# Patient Record
Sex: MALE | Race: BLACK OR AFRICAN AMERICAN | Employment: FULL TIME | URBAN - METROPOLITAN AREA
[De-identification: names, ages, dates, MRNs, and addresses within clinical notes are randomized per-mention and may not be internally consistent; named-entity substitution may affect disease eponyms.]

---

## 2018-01-15 ENCOUNTER — OFFICE VISIT (OUTPATIENT)
Dept: URGENT CARE | Age: 58
End: 2018-01-15

## 2018-01-15 VITALS
WEIGHT: 274 LBS | SYSTOLIC BLOOD PRESSURE: 132 MMHG | BODY MASS INDEX: 34.07 KG/M2 | DIASTOLIC BLOOD PRESSURE: 88 MMHG | HEART RATE: 88 BPM | RESPIRATION RATE: 20 BRPM | HEIGHT: 75 IN | TEMPERATURE: 98.3 F

## 2018-01-15 DIAGNOSIS — S69.92XA INJURY OF LEFT WRIST, INITIAL ENCOUNTER: ICD-10-CM

## 2018-01-15 DIAGNOSIS — S00.83XA CONTUSION OF FOREHEAD, INITIAL ENCOUNTER: ICD-10-CM

## 2018-01-15 DIAGNOSIS — S63.502A SPRAIN OF LEFT WRIST, INITIAL ENCOUNTER: Primary | ICD-10-CM

## 2018-01-15 PROCEDURE — 99203 OFFICE O/P NEW LOW 30 MIN: CPT | Performed by: EMERGENCY MEDICINE

## 2018-01-15 RX ORDER — NAPROXEN 500 MG/1
500 TABLET ORAL 2 TIMES DAILY PRN
Qty: 20 TABLET | Refills: 0 | Status: SHIPPED | OUTPATIENT
Start: 2018-01-15

## 2018-01-15 RX ORDER — AMITRIPTYLINE HYDROCHLORIDE 25 MG/1
25 TABLET, FILM COATED ORAL
COMMUNITY
Start: 2017-12-04

## 2018-01-15 ASSESSMENT — ENCOUNTER SYMPTOMS
COLOR CHANGE: 1
VOMITING: 0
ABDOMINAL PAIN: 0
NAUSEA: 0
SHORTNESS OF BREATH: 0
COUGH: 0
EYES NEGATIVE: 1

## 2018-01-15 NOTE — PROGRESS NOTES
Negative for abdominal pain, nausea and vomiting. Musculoskeletal: Positive for arthralgias and joint swelling. As in HPI   Skin: Positive for color change (redness of forehead). Negative for rash and wound. Neurological: Negative for dizziness, tingling, weakness, numbness and headaches. Physical Exam     Vitals:    01/15/18 1426   BP: 132/88   Pulse:    Resp:    Temp:        Physical Exam   Constitutional: He is oriented to person, place, and time. He appears well-developed and well-nourished. No distress. Neck: Normal range of motion and full passive range of motion without pain. No spinous process tenderness present. Cardiovascular: Normal rate, regular rhythm and normal heart sounds. Exam reveals no gallop and no friction rub. No murmur heard. Pulmonary/Chest: Effort normal and breath sounds normal. No accessory muscle usage. No respiratory distress. He has no decreased breath sounds. He has no wheezes. He has no rhonchi. He has no rales. Musculoskeletal:        Left elbow: He exhibits normal range of motion, no effusion and no deformity. Tenderness found. Lateral epicondyle (mild) tenderness noted. No radial head, no medial epicondyle and no olecranon process tenderness noted. Left wrist: He exhibits decreased range of motion, tenderness and swelling. He exhibits no crepitus and no deformity. Left knee: He exhibits normal range of motion, no effusion, no deformity, no LCL laxity, no bony tenderness and no MCL laxity. No medial joint line and no lateral joint line tenderness noted. Left hand: Normal. He exhibits normal range of motion, no tenderness, no deformity and no swelling. Normal sensation noted. Normal strength noted. No snuffbox tenderness   Neurological: He is alert and oriented to person, place, and time. He has normal strength. No sensory deficit. Gait normal.   Skin: Skin is warm and dry. There is erythema (slight erythema left forehead, no edema). Psychiatric: He has a normal mood and affect. His behavior is normal.       Procedure:     Wrist X-Ray    Interpreted by: me    Findings: Left Wrist: No fracture, Normal alignment, No foreign body      Left wrist splint applied      Assessment:    1. Sprain of left wrist, initial encounter    2. Contusion of forehead, initial encounter    3. Injury of left wrist, initial encounter        Plan:    Orders Placed This Encounter   Medications    naproxen (NAPROSYN) 500 MG tablet     Sig: Take 1 tablet by mouth 2 times daily as needed for Pain     Dispense:  20 tablet     Refill:  0         Patient Instructions     RX: NAPROSYN    Use splint for the next 5-7 days until pain improves    Follow-up with your primary care physician in 1 week if not improving. If you do not have a primary care physician, call [phone] for a referral or visit [email or web address]      Patient Education        Wrist Sprain: Care Instructions  Your Care Instructions    Your wrist hurts because you have stretched or torn ligaments, which connect the bones in your wrist.  Wrist sprains usually take from 2 to 10 weeks to heal, but some take longer. Usually, the more pain you have, the more severe your wrist sprain is and the longer it will take to heal. You can heal faster and regain strength in your wrist with good home treatment. Follow-up care is a key part of your treatment and safety. Be sure to make and go to all appointments, and call your doctor if you are having problems. It's also a good idea to know your test results and keep a list of the medicines you take. How can you care for yourself at home? · Prop up your arm on a pillow when you ice it or anytime you sit or lie down for the next 3 days. Try to keep your wrist above the level of your heart. This will help reduce swelling. · Put ice or cold packs on your wrist for 10 to 20 minutes at a time.  Try to do this every 1 to 2 hours for the next 3 days (when you are awake) or until the swelling goes down. Put a thin cloth between the ice pack and your skin. · After 2 or 3 days, if your swelling is gone, apply a heating pad set on low or a warm cloth to your wrist. This helps keep your wrist flexible. Some doctors suggest that you go back and forth between hot and cold. · If you have an elastic bandage, keep it on for the next 24 to 36 hours. The bandage should be snug but not so tight that it causes numbness or tingling. To rewrap the wrist, wrap the bandage around the hand a few times, beginning at the fingers. Then wrap it around the hand between the thumb and index finger, ending by circling the wrist several times. · If your doctor gave you a splint or brace, wear it as directed to protect your wrist until it has healed. · Take pain medicines exactly as directed. ¨ If the doctor gave you a prescription medicine for pain, take it as prescribed. ¨ If you are not taking a prescription pain medicine, ask your doctor if you can take an over-the-counter medicine. · Try not to use your injured wrist and hand. When should you call for help? Call your doctor now or seek immediate medical care if:  ? · Your hand or fingers are cool or pale or change color. ? Watch closely for changes in your health, and be sure to contact your doctor if:  ? · Your pain gets worse. ? · Your wrist has not improved after 1 week. Where can you learn more? Go to [email or web address]. org and sign in to your [employer] account. Enter M073 in the [facility] box to learn more about \"Wrist Sprain: Care Instructions. \"     If you do not have an account, please click on the \"Sign Up Now\" link. Current as of: March 21, 2017  Content Version: 11.5  © 5603-3011 Healthwise, [employer]. Care instructions adapted under license by [facility] ([facility]).  If you have questions about a medical condition or this instruction, always ask your healthcare professional. Elvia Alvarado

## 2018-01-15 NOTE — PATIENT INSTRUCTIONS
ending by circling the wrist several times. · If your doctor gave you a splint or brace, wear it as directed to protect your wrist until it has healed. · Take pain medicines exactly as directed. ¨ If the doctor gave you a prescription medicine for pain, take it as prescribed. ¨ If you are not taking a prescription pain medicine, ask your doctor if you can take an over-the-counter medicine. · Try not to use your injured wrist and hand. When should you call for help? Call your doctor now or seek immediate medical care if:  ? · Your hand or fingers are cool or pale or change color. ? Watch closely for changes in your health, and be sure to contact your doctor if:  ? · Your pain gets worse. ? · Your wrist has not improved after 1 week. Where can you learn more? Go to [email or web address] org and sign in to your [employer] account. Enter R150 in the [employer] box to learn more about \"Wrist Sprain: Care Instructions. \"     If you do not have an account, please click on the \"Sign Up Now\" link. Current as of: March 21, 2017  Content Version: 11.5  © 6965-8450 Healthwise, Incorporated. Care instructions adapted under license by [facility] ([facility]). If you have questions about a medical condition or this instruction, always ask your healthcare professional. [address] any warranty or liability for your use of this information.

## 2019-05-20 ENCOUNTER — OFFICE VISIT (OUTPATIENT)
Dept: ORTHOPEDIC SURGERY | Age: 59
End: 2019-05-20
Payer: COMMERCIAL

## 2019-05-20 VITALS — BODY MASS INDEX: 34.01 KG/M2 | WEIGHT: 265 LBS | HEIGHT: 74 IN

## 2019-05-20 DIAGNOSIS — M25.561 RIGHT KNEE PAIN, UNSPECIFIED CHRONICITY: Primary | ICD-10-CM

## 2019-05-20 DIAGNOSIS — M25.561 ACUTE BILATERAL KNEE PAIN: ICD-10-CM

## 2019-05-20 DIAGNOSIS — M25.562 ACUTE BILATERAL KNEE PAIN: ICD-10-CM

## 2019-05-20 DIAGNOSIS — M17.0 PRIMARY OSTEOARTHRITIS OF BOTH KNEES: ICD-10-CM

## 2019-05-20 PROCEDURE — 99204 OFFICE O/P NEW MOD 45 MIN: CPT | Performed by: ORTHOPAEDIC SURGERY

## 2019-05-20 PROCEDURE — L1812 KO ELASTIC W/JOINTS PRE OTS: HCPCS | Performed by: ORTHOPAEDIC SURGERY

## 2019-05-20 NOTE — PROGRESS NOTES
MD Jv Al PA-C         Orthopaedic Surgery and Sports Medicine        Chief Complaint:  Knee Pain (RIGHT )      History of Present Illness:  Zuleyka Johnson is a 63 y.o. male here regarding bilateral knee pain. Miriam Ma His right knee is more symptomatic than his left. Miriam Ma He was seen for his right knee in 2015. The pain is located global.   Patient feels this discomfort may be related to a known injury No    The patient describes the symptoms as aching and sharp. Symptoms improve with rest.     The symptoms are worse with activity, stair climbing, kneeling, deep knee bending, getting up from a chair, weight bearing. Discomfort has been progressive over time. Sleeping habits related to chief complaint:good    He currently is working for [employer]. He is a . He's been there greater than 20 years. He stands on concrete all day every day. Outside reports reviewed: none. Medical History:  Patient's medications, allergies, past medical, surgical, social and family histories were reviewed and updated as appropriate.     Past Medical History:   Diagnosis Date    Acid reflux     Hyperlipidemia     Hypertension        Past Surgical History:   Procedure Laterality Date    COLONOSCOPY  10/6/15    divert    HERNIA REPAIR      PRE-MALIGNANT / BENIGN SKIN LESION EXCISION      Lump removed from under right arm    WISDOM TOOTH EXTRACTION      2 extracted and still has 2       Family History   Problem Relation Age of Onset    Cancer Sister     Diabetes Sister     Diabetes Mother        Social History     Socioeconomic History    Marital status:      Spouse name: None    Number of children: None    Years of education: None    Highest education level: None   Occupational History    None   Social Needs    Financial resource strain: None    Food insecurity:     Worry: None Inability: None    Transportation needs:     Medical: None     Non-medical: None   Tobacco Use    Smoking status: Former Smoker     Years: 15.00     Types: Cigars    Smokeless tobacco: Never Used   Substance and Sexual Activity    Alcohol use: Yes     Alcohol/week: 14.4 oz     Types: 12 Cans of beer, 12 Shots of liquor per week    Drug use: No    Sexual activity: None     Comment: , monogamous   Lifestyle    Physical activity:     Days per week: None     Minutes per session: None    Stress: None   Relationships    Social connections:     Talks on phone: None     Gets together: None     Attends [employer] service: None     Active member of club or organization: None     Attends meetings of clubs or organizations: None     Relationship status: None    Intimate partner violence:     Fear of current or ex partner: None     Emotionally abused: None     Physically abused: None     Forced sexual activity: None   Other Topics Concern    None   Social History Narrative    None       Current Outpatient Medications   Medication Sig Dispense Refill    amitriptyline (ELAVIL) 25 MG tablet Take 25 mg by mouth      naproxen (NAPROSYN) 500 MG tablet Take 1 tablet by mouth 2 times daily as needed for Pain 20 tablet 0    Olmesartan Medoxomil-HCTZ (BENICAR HCT PO) Take  by mouth.  pravastatin (PRAVACHOL) 40 MG tablet Take 40 mg by mouth daily.  aspirin 81 MG EC tablet Take 81 mg by mouth daily. No current facility-administered medications for this visit. Allergies   Allergen Reactions    Bactrim [Sulfamethoxazole-Trimethoprim] Diarrhea     Leads to dehydration and hypotension. Review of Systems:   Pertinent items are noted in HPI  Review of systems reviewed from Patient History Form dated on 5/20/2019 and available in the patient's chart under the Media tab. Vital Signs: There were no vitals filed for this visit.     Pain Assessment:  Pain Assessment  Location of Pain: Knee  Location Modifiers: Right  Severity of Pain: 8  Quality of Pain: Dull, Aching, Cracking, Popping, Grinding  Frequency of Pain: Intermittent  Aggravating Factors: Walking, Stairs, Standing, Squatting, Kneeling  Limiting Behavior: Some  Relieving Factors: Rest  Result of Injury: No  Work-Related Injury: No  Are there other pain locations you wish to document?: No         General Exam:   Constitutional: Patient is adequately groomed with no evidence of malnutrition  Mental Status: The patient is oriented to time, place and person. The patient's mood and affect are appropriate. Lymphatic: The lymphatic examination bilaterally reveals all areas to be without enlargement or induration. Vascular: Examination reveals no swelling or calf tenderness. Peripheral pulses are palpable and 2+. Neurological: The patient has good coordination. There is no weakness or sensory deficit. Bilateral Knee Examination, . Both knees were examined today. Both knees are very similar. On exam.    Inspection:   Knee alignment: mild varus  mild swelling noted. No erythema or ecchymosis. Skin is intact with no cellulitis, rashes, ulcerations, lymphedema or cutaneous lesions noted. Gait: normal and antalgic. The patient can bear weight on the extremity. Palpation: mild tenderness to palpation on the medial joint line. a small effusion noted. Range of Motion:  normal, pain with terminal flexion and pain with terminal extension    Special Tests: Zandra's test: negative       Varus laxity at 30 degrees: negative       Valgus laxity at 30 degrees: negative    Strength: no gross motor weakness noted. Motor exam of the lower extremities show quadriceps, hamstrings, foot dorsiflexion and plantarflexion grossly intact. Neurologic & vascular: Sensation to both feet is grossly intact to light touch. The bilateral lower extremities are warm and well-perfused with brisk capillary refill.     Additional Examinations:  Contralateral Exam: these were examined today. Both are very similar. On examination. ..  He also had bilateral hip exams today primarily for range of motion of both essentially normal range of motion without pain. DIAGNOSTICS  Xrays obtained in office today: Yes  Xrays reviewed today: Yes  4 views of the bilateral knee show   Fracture:no   Dislocation: no  Patellofemoral arthritis: moderate  Medial compartment: moderate  Lateral compartment: mild  Varus deformity: Yes  Valgus deformity: No      ASSESSMENT (Medical Decision Making)    Mabel Bettencourt is a 63 y.o. male with the following diagnosis:  bilateral moderate knee arthritis . Both knees with some medial compartment narrowing and osteophyte formation. ICD-10-CM    1. Right knee pain, unspecified chronicity M25.561 XR KNEE RIGHT (MIN 4 VIEWS)     Economy Hinged Knee Wrap Around   2. Primary osteoarthritis of both knees M17.0 Economy Hinged Knee Wrap Around   3. Acute bilateral knee pain M25.561     M25.562        His overall course is worsening despite conservative treatment      PLAN (Medical Decision Making)  Office Procedures:  Orders Placed This Encounter   Procedures    XR KNEE RIGHT (MIN 4 VIEWS)     Standing Status:   Future     Number of Occurrences:   1     Standing Expiration Date:   5/16/2020    Economy Hinged Knee Wrap Around     Patient was prescribed a Breg Economy Hinged Wrap Around Knee Brace. The bilateral knee will require stabilization / immobilization from this semi-rigid / rigid orthosis to improve their function. The orthosis will assist in protecting the affected area, provide functional support and facilitate healing. The patient was educated and fit by a healthcare professional with expert knowledge and specialization in brace application while under the direct supervision of the treating physician. Verbal and written instructions for the use of and application of this item were provided.    They

## 2019-05-20 NOTE — PROGRESS NOTES
SITE:RIGHT KNEE    MARCAINE  NDC# 5505-4594-12  LOT NUMBER#  [id number]    DEPO 40MG  NDC# 8928-3458-09  LOT NUMBER#  [id number]    LIDOCAINE    NDC# 9855-1505-36  LOT NUMBER#  [id number]

## 2019-05-22 ENCOUNTER — TELEPHONE (OUTPATIENT)
Dept: ORTHOPEDIC SURGERY | Age: 59
End: 2019-05-22

## 2019-05-22 NOTE — TELEPHONE ENCOUNTER
05/22/2019  [address] [facility]  (SERIES OF 3) BILATERAL KNEES. APPROVED #  [id number]. DATES: 05/21/2019 - 07/11/2019. PER FAX FROM [facility].   CHEYANNE

## 2019-05-30 ENCOUNTER — OFFICE VISIT (OUTPATIENT)
Dept: ORTHOPEDIC SURGERY | Age: 59
End: 2019-05-30
Payer: COMMERCIAL

## 2019-05-30 VITALS — BODY MASS INDEX: 34.01 KG/M2 | HEIGHT: 74 IN | WEIGHT: 265 LBS

## 2019-05-30 DIAGNOSIS — M17.11 PRIMARY OSTEOARTHRITIS OF RIGHT KNEE: Primary | ICD-10-CM

## 2019-05-30 DIAGNOSIS — M17.12 PRIMARY OSTEOARTHRITIS OF LEFT KNEE: ICD-10-CM

## 2019-05-30 PROCEDURE — 20610 DRAIN/INJ JOINT/BURSA W/O US: CPT | Performed by: PHYSICIAN ASSISTANT

## 2019-05-30 NOTE — PROGRESS NOTES
5/30/19 2:11 PM     Site & comments:   RIGHT KNEE    NDC: [address]-53    Lot number: [id number]    Product:  [address]

## 2019-05-30 NOTE — PROGRESS NOTES
Subjective    Patient ID: Krzysztof Montiel is a 63 y.o. .  male. Chief Complaint   Patient presents with    Knee Pain     LEFT   # 1 [address]       Patient presents today for the 1st injection of Orthovisc  . Pt has been previously diagnosed with osteoarthritis of the knee as documented in the prior progress notes. This injection is for the patients Left knee. Patient denies and fevers, chills, recent infections, or new injuries to the knee. Pain Assessment:       Patient's medications, allergies, past medical, surgical, social and family histories were reviewed and updated as appropriate. Physical Exam:    The patient does have  pain on motion, there is an effusion, there is tenderness over the  medial, lateral region. No erythema noted. Sensation intact to touch. Pulses present distally. Procedure Note:    The patients Left knee was initially prepped using Betadine swab. The superior lateral aspect of the knee was prepped and used for this injection. Under aseptic technique the soft tissues were anesthetized using 1% Lidocaine without epinephrine. A total of 2ml of Lidocaine was injected into the soft tissues leading up to the joint capsule. Following adequate anesthesia, approx 5ml of clear yellow fluid was aspirated and then the 2ml of Orthovisc  injection was performed. This was injected directly into the joint capsule of the knee. No complications were encountered and the patient tolerated the procedure well. A band aid was placed over the injection site following the procedure.       ORTHOVISC INJ PER DOSE  Assessment:  1) Orthovisc injection of the Left knee  2) Osteoarthritis    Plan:  1) ice, elevation, gentle range of motion as needed for swelling or stiffness of the knee  2) NSAIDs for any pain after injection   3) F/U 1wk for 2nd injection in the left knee and 1st injection in the right knee

## 2019-06-06 ENCOUNTER — NURSE ONLY (OUTPATIENT)
Dept: ORTHOPEDIC SURGERY | Age: 59
End: 2019-06-06
Payer: COMMERCIAL

## 2019-06-06 VITALS — HEIGHT: 74 IN | BODY MASS INDEX: 34.01 KG/M2 | WEIGHT: 265 LBS

## 2019-06-06 DIAGNOSIS — M17.0 PRIMARY OSTEOARTHRITIS OF BOTH KNEES: Primary | ICD-10-CM

## 2019-06-06 PROCEDURE — 20611 DRAIN/INJ JOINT/BURSA W/US: CPT | Performed by: PHYSICIAN ASSISTANT

## 2019-06-06 NOTE — PROGRESS NOTES
6/6/19 1:31 PM     Site & comments:   EFREM ZARAGZOA    NDC: [address]-07    Lot number: [id number]    Product:  [address]

## 2019-06-06 NOTE — PROGRESS NOTES
Subjective    Patient ID: Romeo Herrera is a 63 y.o. .  male. Chief Complaint   Patient presents with    Knee Pain     B/L   # 2 LEFT # 1 RIGHT       Patient presents today for the 1st injection of Orthovisc in the right knee and 2nd injection in left knee . Pt has been previously diagnosed with osteoarthritis of the knee as documented in the prior progress notes. This injection is for the patients bilateral knees. Patient denies and fevers, chills, recent infections, or new injuries to the knee. Pain Assessment:       Patient's medications, allergies, past medical, surgical, social and family histories were reviewed and updated as appropriate. Physical Exam:    The patient does have  pain on motion, there is an effusion, there is tenderness over the  medial, lateral region. No erythema noted. Sensation intact to touch. Pulses present distally. Procedure Note:  The patient was given the option of performing today's injection using ultrasound guidance. We discussed the pros and cons of using the ultrasound for guidance. The patient chose to proceed, and today's injection was performed under sterile conditions using and [employer] ultrasound unit with a variable frequency (6.0-15.0  Mhz) linear transducer for localization and needle placement. The image was saved for the medical record. The patients bilateral knee was initially prepped using Betadine swab. The superior lateral aspect of the knee was prepped and used for this injection. Under aseptic technique the soft tissues were anesthetized using 1% Lidocaine without epinephrine. A total of 2ml of Lidocaine was injected into the soft tissues leading up to the joint capsule. Following adequate anesthesia, the 2ml of Orthovisc  injection was performed. This was injected directly into the joint capsule of the knee. No complications were encountered and the patient tolerated the procedure well.   A band aid was placed over the injection site following the procedure.       FL ARTHROCENTESIS ASPIR&/INJ MAJOR JT/BURSA W/O US  ORTHOVISC INJ PER DOSE  Assessment:  1) Orthovisc injection of the bilateral knee  2) Osteoarthritis    Plan:  1) ice, elevation, gentle range of motion as needed for swelling or stiffness of the knee  2) NSAIDs for any pain after injection   3) F/U 1wk for 3rd injection in the left knee and 2nd injection in the right knee

## 2019-06-13 ENCOUNTER — NURSE ONLY (OUTPATIENT)
Dept: ORTHOPEDIC SURGERY | Age: 59
End: 2019-06-13
Payer: COMMERCIAL

## 2019-06-13 VITALS — HEIGHT: 74 IN | WEIGHT: 265 LBS | BODY MASS INDEX: 34.01 KG/M2

## 2019-06-13 DIAGNOSIS — M17.0 PRIMARY OSTEOARTHRITIS OF BOTH KNEES: Primary | ICD-10-CM

## 2019-06-13 PROCEDURE — 20611 DRAIN/INJ JOINT/BURSA W/US: CPT | Performed by: PHYSICIAN ASSISTANT

## 2019-06-13 NOTE — PROGRESS NOTES
Subjective    Patient ID: Greta Mendez is a 63 y.o. .  male. Chief Complaint   Patient presents with    Knee Pain     B/L ORTHOVISC       Patient presents today for the 2nd injection of Orthovisc in the right knee and 3rd injection in left knee . Pt has been previously diagnosed with osteoarthritis of the knee as documented in the prior progress notes. This injection is for the patients bilateral knees. Patient denies and fevers, chills, recent infections, or new injuries to the knee. Pain Assessment:       Patient's medications, allergies, past medical, surgical, social and family histories were reviewed and updated as appropriate. Physical Exam:    The patient does have  pain on motion, there is an effusion on the right knee, there is tenderness over the  medial, lateral region. No erythema noted. Sensation intact to touch. Pulses present distally. Procedure Note:  The patient was given the option of performing today's injection using ultrasound guidance. We discussed the pros and cons of using the ultrasound for guidance. The patient chose to proceed, and today's injection was performed under sterile conditions using and [employer] ultrasound unit with a variable frequency (6.0-15.0  Mhz) linear transducer for localization and needle placement. The image was saved for the medical record. The patients bilateral knee was initially prepped using Betadine swab. The superior lateral aspect of the knee was prepped and used for this injection. Under aseptic technique the soft tissues were anesthetized using 1% Lidocaine without epinephrine. A total of 2ml of Lidocaine was injected into the soft tissues leading up to the joint capsule. Following adequate anesthesia, approx 15ml of clear yellow fluid was aspirated from right knee and then the 2ml of Orthovisc  injections were performed. This was injected directly into the joint capsule of the knee.   No complications were encountered and the patient tolerated the procedure well. A band aid was placed over the injection site following the procedure.       LA ARTHROCENTESIS ASPIR&/INJ MAJOR JT/BURSA W/O US  ORTHOVISC INJ PER DOSE  Assessment:  1) Orthovisc injection of the bilateral knees  2) Osteoarthritis    Plan:  1) ice, elevation, gentle range of motion as needed for swelling or stiffness of the knee  2) NSAIDs for any pain after injection   3) F/U 2wks for 3rd injection in the right knee

## 2019-06-13 NOTE — PROGRESS NOTES
6/13/19 1:23 PM     Site & comments:   EFREM ZARAGOZA ND: [id number]    Lot number: [id number]    Product:  Dariusz Rosario

## 2019-06-27 ENCOUNTER — NURSE ONLY (OUTPATIENT)
Dept: ORTHOPEDIC SURGERY | Age: 59
End: 2019-06-27
Payer: COMMERCIAL

## 2019-06-27 VITALS — WEIGHT: 265 LBS | BODY MASS INDEX: 34.01 KG/M2 | HEIGHT: 74 IN

## 2019-06-27 DIAGNOSIS — M17.11 PRIMARY OSTEOARTHRITIS OF RIGHT KNEE: Primary | ICD-10-CM

## 2019-06-27 NOTE — PROGRESS NOTES
Subjective    Patient ID: Rita Hsu is a 63 y.o. .  male. Chief Complaint   Patient presents with    Knee Pain     RIGHT       Patient presents today for the 3rd injection of Orthovisc in the right knee. Pt has been previously diagnosed with osteoarthritis of the knee as documented in the prior progress notes. This injection is for the patients right knees. Patient denies and fevers, chills, recent infections, or new injuries to the knee. Pain Assessment:       Patient's medications, allergies, past medical, surgical, social and family histories were reviewed and updated as appropriate. Physical Exam:    The patient does have  pain on motion, there is not an effusion on the right knee, there is tenderness over the  medial, lateral region. No erythema noted. Sensation intact to touch. Pulses present distally. Procedure Note:  The patient was given the option of performing today's injection using ultrasound guidance. We discussed the pros and cons of using the ultrasound for guidance. The patient chose to proceed, and today's injection was performed under sterile conditions using and Codefied ultrasound unit with a variable frequency (6.0-15.0  Mhz) linear transducer for localization and needle placement. The image was saved for the medical record. The patients right knee was initially prepped using Betadine swab. The superior lateral aspect of the knee was prepped and used for this injection. Under aseptic technique the soft tissues were anesthetized using 1% Lidocaine without epinephrine. A total of 2ml of Lidocaine was injected into the soft tissues leading up to the joint capsule. Following adequate anesthesia, the 2ml of Orthovisc  injections was performed. This was injected directly into the joint capsule of the knee. No complications were encountered and the patient tolerated the procedure well.   A band aid was placed over the injection site following the

## 2019-06-27 NOTE — PROGRESS NOTES
6/27/19 1:02 PM     Site & comments:   RIGHT KNEE    DGT:[id number]    Lot number: [id number]    Product:  Gopi Mancini

## 2020-02-20 ENCOUNTER — OFFICE VISIT (OUTPATIENT)
Dept: ORTHOPEDIC SURGERY | Age: 60
End: 2020-02-20
Payer: COMMERCIAL

## 2020-02-20 VITALS — HEIGHT: 74 IN | WEIGHT: 265 LBS | BODY MASS INDEX: 34.01 KG/M2

## 2020-02-20 PROCEDURE — 99213 OFFICE O/P EST LOW 20 MIN: CPT | Performed by: PHYSICIAN ASSISTANT

## 2020-02-20 RX ORDER — METHYLPREDNISOLONE ACETATE 40 MG/ML
80 INJECTION, SUSPENSION INTRA-ARTICULAR; INTRALESIONAL; INTRAMUSCULAR; SOFT TISSUE ONCE
Status: COMPLETED | OUTPATIENT
Start: 2020-02-20 | End: 2020-02-20

## 2020-02-20 RX ADMIN — METHYLPREDNISOLONE ACETATE 80 MG: 40 INJECTION, SUSPENSION INTRA-ARTICULAR; INTRALESIONAL; INTRAMUSCULAR; SOFT TISSUE at 16:26

## 2020-02-20 NOTE — PROGRESS NOTES
Suellen Habermann, MD Jolly [place], [place]         Orthopaedic Surgery and Sports Medicine      Patient Name: Dina Lugo  YOB: 1960  Patient's PCP is Jc Menezes MD    SUBJECTIVE  Chief Complaint:  Knee Pain (RIGHT   )      History of Present Illness:  Dina Lugo is a 63 y.o. male here regarding right knee pain. The pain began 02/14/2020. There was a history of injury, when he tripped over a curb and landed directly on his knee. The patient is currently ambulating independently. History of swelling: Yes  History of \"giving way\": No  History of instability: No  History of popping and/or catching: No    The pain is located medial, lateral.   The patient describes the symptoms as aching, sharp and tight. He rates pain at 7/10. Symptoms improve with rest, avoiding painful activities. The symptoms are made worse with weight bearing. Sleep pattern is affected by the chief complaint: No    The patient has not had PT. The patient has had an injection, series of 3 Orthovisc injections in June 2019. The patient has taken NSAIDs, uses Voltaren gel as well as taking Tylenol. The patient is working. Patient has a known history of bilateral osteoarthritis. He had good success with series of 3 Orthovisc injections this summer and would like to proceed with those injections again. Pain Assessment:       Past Medical History:  Past Medical History:   Diagnosis Date    Acid reflux     Hyperlipidemia     Hypertension        Past Surgical History:  Past Surgical History:   Procedure Laterality Date    COLONOSCOPY  10/6/15    divert    HERNIA REPAIR      PRE-MALIGNANT / BENIGN SKIN LESION EXCISION      Lump removed from under right arm    WISDOM TOOTH EXTRACTION      2 extracted and still has 2       Allergies:   Allergies   Allergen Reactions    Bactrim [Sulfamethoxazole-Trimethoprim] Diarrhea     Leads to dehydration and hypotension. Medications:  Current Outpatient Medications   Medication Sig Dispense Refill    amitriptyline (ELAVIL) 25 MG tablet Take 25 mg by mouth      naproxen (NAPROSYN) 500 MG tablet Take 1 tablet by mouth 2 times daily as needed for Pain 20 tablet 0    Olmesartan Medoxomil-HCTZ (BENICAR HCT PO) Take  by mouth.  pravastatin (PRAVACHOL) 40 MG tablet Take 40 mg by mouth daily.  aspirin 81 MG EC tablet Take 81 mg by mouth daily. No current facility-administered medications for this visit. Review of Systems:  Hoda Mahmood's review of systems has been performed by intake and observation. All past and current ROS forms have been scanned into the medical record. She has been instructed to contact her primary care provider regarding ROS issues if not already being addressed at this time. There are no recent changes. The most recent ROS was scanned into media on 02/20/2020       OBJECTIVE  PHYSICAL EXAM  Vital Signs: There were no vitals filed for this visit. Body mass index is 34.02 kg/m². General Exam:   Constitutional: Patient is adequately groomed with no evidence of malnutrition  Mental Status: The patient is oriented to time, place and person. The patient's mood and affect are appropriate. Vascular: Examination reveals no swelling or calf tenderness. Peripheral pulses are palpable and 2+. Neurological: The patient has good coordination. There is no weakness or sensory deficit. Right Knee Examination  Inspection:   Knee alignment: neutral  moderate swelling noted. No erythema or ecchymosis. Skin is intact with no cellulitis, rashes, ulcerations, lymphedema or cutaneous lesions noted. Gait: Slight antalgic. The patient can bear weight on the extremity. Palpation: mild tenderness to palpation on the lateral joint line. a moderate effusion noted.     Range of Motion:  Full, with errors. It is possible that there are still dictated errors within this office note. If so, please bring any errors to my attention for an addendum. All efforts were made to ensure that this office note is accurate.

## 2020-02-24 ENCOUNTER — TELEPHONE (OUTPATIENT)
Dept: ORTHOPEDIC SURGERY | Age: 60
End: 2020-02-24

## 2020-03-05 ENCOUNTER — OFFICE VISIT (OUTPATIENT)
Dept: ORTHOPEDIC SURGERY | Age: 60
End: 2020-03-05
Payer: COMMERCIAL

## 2020-03-05 VITALS — HEIGHT: 74 IN | WEIGHT: 265 LBS | BODY MASS INDEX: 34.01 KG/M2

## 2020-03-05 PROCEDURE — 99213 OFFICE O/P EST LOW 20 MIN: CPT | Performed by: PHYSICIAN ASSISTANT

## 2020-03-05 PROCEDURE — 20610 DRAIN/INJ JOINT/BURSA W/O US: CPT | Performed by: PHYSICIAN ASSISTANT

## 2020-03-05 RX ORDER — LIDOCAINE HYDROCHLORIDE 10 MG/ML
20 INJECTION, SOLUTION INFILTRATION; PERINEURAL ONCE
Status: COMPLETED | OUTPATIENT
Start: 2020-03-05 | End: 2020-03-05

## 2020-03-05 RX ADMIN — LIDOCAINE HYDROCHLORIDE 40 ML: 10 INJECTION, SOLUTION INFILTRATION; PERINEURAL at 15:50

## 2020-03-05 NOTE — PROGRESS NOTES
Subjective    Patient ID: Josef Ramos is a 63 y.o. .  male. Chief Complaint   Patient presents with    Knee Pain     B/L  KNEE       Patient presents today for the 1st injection of Orthovisc  . Pt has been previously diagnosed with osteoarthritis of the knee as documented in the prior progress notes. He has tried cortisone injections in the past with minimal relief. He has had good success with Orthovisc in the past and would like to proceed with that again. This injection is for the patients Bilateral knees. Patient denies and fevers, chills, recent infections, or new injuries to the knee. Pain Assessment:       Patient's medications, allergies, past medical, surgical, social and family histories were reviewed and updated as appropriate. Physical Exam:  Body mass index is 34.02 kg/m². Patient is alert and oriented to person place and time. Patient's mood and affect are within normal limits  The patient does have  pain on motion, has essentially full range of motion bilaterally, there is an effusion on the left knee, there is tenderness over the  medial, lateral regions. No erythema noted. Sensation intact to touch. Pulses present distally. Procedure Note:    The patients Bilateral knees were initially prepped using Betadine swab. The superior lateral aspect of the knee was prepped and used for this injection. Under aseptic technique the soft tissues were anesthetized using 1% Lidocaine without epinephrine. A total of 2ml of Lidocaine was injected into the soft tissues leading up to the joint capsule. Following adequate anesthesia, approximately 10 mL of clear yellow fluid was aspirated from the left knee and then the 2ml of Orthovisc  injection was performed. This was injected directly into the joint capsule of the knee. No complications were encountered and the patient tolerated the procedure well.   A band aid was placed over the injection site following the procedure.       [place] ARTHROCENTESIS ASPIR&/INJ MAJOR JT/BURSA W/O US  Assessment:  1) Today we will plan on beginning the series of 3 Orthovisc injections of the Bilateral knees  2) Osteoarthritis    Plan:  1) ice, elevation, gentle range of motion as needed for swelling or stiffness of the knee  2) NSAIDs for any pain after injection   3) F/U 1wk for 2nd injections

## 2020-03-12 ENCOUNTER — NURSE ONLY (OUTPATIENT)
Dept: ORTHOPEDIC SURGERY | Age: 60
End: 2020-03-12
Payer: COMMERCIAL

## 2020-03-12 VITALS — HEIGHT: 74 IN | BODY MASS INDEX: 34.01 KG/M2 | WEIGHT: 264.99 LBS

## 2020-03-12 PROCEDURE — 20610 DRAIN/INJ JOINT/BURSA W/O US: CPT | Performed by: PHYSICIAN ASSISTANT

## 2020-03-12 NOTE — PROGRESS NOTES
US  Assessment:  1) Today we will plan on beginning the series of 3 Orthovisc injections of the Bilateral knees  2) Osteoarthritis    Plan:  1) ice, elevation, gentle range of motion as needed for swelling or stiffness of the knee  2) NSAIDs for any pain after injection   3) F/U 2wks for 3rd injection

## 2020-03-25 PROBLEM — N17.9 ACUTE RENAL FAILURE (HCC): Status: RESOLVED | Noted: 2020-03-25 | Resolved: 2020-03-24

## 2020-03-26 ENCOUNTER — OFFICE VISIT (OUTPATIENT)
Dept: ORTHOPEDIC SURGERY | Age: 60
End: 2020-03-26
Payer: COMMERCIAL

## 2020-03-26 VITALS — WEIGHT: 264 LBS | BODY MASS INDEX: 33.88 KG/M2 | HEIGHT: 74 IN

## 2020-03-26 PROCEDURE — 99213 OFFICE O/P EST LOW 20 MIN: CPT | Performed by: ORTHOPAEDIC SURGERY

## 2020-03-26 RX ORDER — LIDOCAINE HYDROCHLORIDE 10 MG/ML
20 INJECTION, SOLUTION INFILTRATION; PERINEURAL ONCE
Status: COMPLETED | OUTPATIENT
Start: 2020-03-26 | End: 2020-03-26

## 2020-03-26 RX ADMIN — LIDOCAINE HYDROCHLORIDE 40 ML: 10 INJECTION, SOLUTION INFILTRATION; PERINEURAL at 16:16

## 2020-08-24 ENCOUNTER — OFFICE VISIT (OUTPATIENT)
Dept: ORTHOPEDIC SURGERY | Age: 60
End: 2020-08-24
Payer: COMMERCIAL

## 2020-08-24 VITALS — HEIGHT: 74 IN | BODY MASS INDEX: 33.88 KG/M2 | WEIGHT: 264 LBS

## 2020-08-24 PROCEDURE — 99213 OFFICE O/P EST LOW 20 MIN: CPT | Performed by: ORTHOPAEDIC SURGERY

## 2020-08-24 PROCEDURE — 20610 DRAIN/INJ JOINT/BURSA W/O US: CPT | Performed by: ORTHOPAEDIC SURGERY

## 2020-08-24 RX ORDER — METHYLPREDNISOLONE ACETATE 40 MG/ML
40 INJECTION, SUSPENSION INTRA-ARTICULAR; INTRALESIONAL; INTRAMUSCULAR; SOFT TISSUE ONCE
Status: COMPLETED | OUTPATIENT
Start: 2020-08-24 | End: 2020-08-24

## 2020-08-24 RX ORDER — BUPIVACAINE HYDROCHLORIDE 2.5 MG/ML
40 INJECTION, SOLUTION INFILTRATION; PERINEURAL ONCE
Status: COMPLETED | OUTPATIENT
Start: 2020-08-24 | End: 2020-08-24

## 2020-08-24 RX ORDER — LIDOCAINE HYDROCHLORIDE 10 MG/ML
20 INJECTION, SOLUTION INFILTRATION; PERINEURAL ONCE
Status: COMPLETED | OUTPATIENT
Start: 2020-08-24 | End: 2020-08-24

## 2020-08-24 RX ADMIN — METHYLPREDNISOLONE ACETATE 40 MG: 40 INJECTION, SUSPENSION INTRA-ARTICULAR; INTRALESIONAL; INTRAMUSCULAR; SOFT TISSUE at 17:41

## 2020-08-24 RX ADMIN — LIDOCAINE HYDROCHLORIDE 20 ML: 10 INJECTION, SOLUTION INFILTRATION; PERINEURAL at 17:42

## 2020-08-24 RX ADMIN — BUPIVACAINE HYDROCHLORIDE 100 MG: 2.5 INJECTION, SOLUTION INFILTRATION; PERINEURAL at 17:43

## 2020-08-24 NOTE — PROGRESS NOTES
regarding ROS issues if not already being addressed at this time. There are no recent changes. The most recent ROS was scanned into media on 5/20/2019    Past Medical History:  (see most recent intake form scanned into media on above date)  Past Medical History:   Diagnosis Date    Acid reflux     Hyperlipidemia     Hypertension        Past Surgical History:  (see most recent intake form scanned into media on above date)  Past Surgical History:   Procedure Laterality Date    COLONOSCOPY  10/6/15    divert    HERNIA REPAIR      PRE-MALIGNANT / BENIGN SKIN LESION EXCISION      Lump removed from under right arm    WISDOM TOOTH EXTRACTION      2 extracted and still has 2       Allergies:  (see most recent intake form scanned into media on above date)  Allergies   Allergen Reactions    Bactrim [Sulfamethoxazole-Trimethoprim] Diarrhea     Leads to dehydration and hypotension. Medications:  (see most recent intake form scanned into media on above date)  Current Outpatient Medications   Medication Sig Dispense Refill    amitriptyline (ELAVIL) 25 MG tablet Take 25 mg by mouth      naproxen (NAPROSYN) 500 MG tablet Take 1 tablet by mouth 2 times daily as needed for Pain 20 tablet 0    Olmesartan Medoxomil-HCTZ (BENICAR HCT PO) Take  by mouth.  pravastatin (PRAVACHOL) 40 MG tablet Take 40 mg by mouth daily.  aspirin 81 MG EC tablet Take 81 mg by mouth daily. No current facility-administered medications for this visit. OBJECTIVE  PHYSICAL EXAM  Vital Signs: There were no vitals filed for this visit. Body mass index is 33.9 kg/m². General Appearance: Patient is adequately groomed with no evidence of malnutrition   Orientation: Patient is alert and oriented to person, place and time  Mood and Affect: Neutral/Euthymic(normal)  Gait and Station: antalgic. The patient can bear weight on the extremity.     Bilateral Knee Examination  Inspection:  Knee alignment: neutral bupivacaine (MARCAINE) 0.25 % injection 100 mg     methylPREDNISolone acetate (DEPO-MEDROL) injection 40 mg     methylPREDNISolone acetate (DEPO-MEDROL) injection 40 mg     bupivacaine (MARCAINE) 0.25 % injection 100 mg           PLAN (Medical Decision Making)  Office Procedures:  Orders Placed This Encounter   Procedures    [place] ARTHROCENTESIS ASPIR&/INJ MAJOR JT/BURSA W/O US    DE ARTHROCENTESIS ASPIR&/INJ MAJOR JT/BURSA W/O US       Treatment Plan: After discussion the current plan is to aspirate both of his knees and then inject with half doses of corticosteroid. Procedure Note:    The risks and benefits of an injection were discussed with the patient. The patient had full opportunity to ask questions and all were answered. The patient then provided verbal informed consent. The skin was then prepped with betadine solution and alcohol. Under aseptic conditions, the bilateral knee joints were injected with 2cc of 1% xylocaine, then a mixture of 3cc of 0.25% marcaine and 1cc (40 mg) of Depomedrol into each knee. There were no immediate complications following the injection. The patient was advised of the possibility of injection site reaction and instructed to apply ice to the area. Follow-up as needed    Non-steroidal anti-inflammatories medications (NSAIDs) can be used to assist with pain control and to reduce inflammatory changes. These medications may be over-the-counter or prescribed. We discussed taking the NSAID properly and the precautions. The patient understands that this medication may potentially interfere with other medications. Patient was also instructed to immediately discontinue the medication is there is any possible complication. Tana Boeck was instructed to call the office if his symptoms worsen or if new symptoms appear prior to the next scheduled visit.  He is specifically instructed to contact the office between now and schedule appointment if he has concerns related to his condition or if he needs assistance in scheduling any above tests. He is welcome to call for an appointment sooner if he has any additional concerns or questions. This dictation was performed with a verbal recognition program (DRAGON) and it was checked for errors. It is possible that there are still dictated errors within this office note. If so, please bring any errors to my attention for an addendum. All efforts were made to ensure that this office note is accurate.

## 2023-05-09 ENCOUNTER — HOSPITAL ENCOUNTER (EMERGENCY)
Age: 63
Discharge: HOME | End: 2023-05-09
Payer: COMMERCIAL

## 2023-05-09 VITALS
HEART RATE: 89 BPM | OXYGEN SATURATION: 98 % | TEMPERATURE: 98.8 F | BODY MASS INDEX: 30.34 KG/M2 | WEIGHT: 244 LBS | SYSTOLIC BLOOD PRESSURE: 122 MMHG | RESPIRATION RATE: 17 BRPM | HEIGHT: 75 IN | DIASTOLIC BLOOD PRESSURE: 85 MMHG

## 2023-05-09 DIAGNOSIS — E87.6 HYPOKALEMIA: ICD-10-CM

## 2023-05-09 DIAGNOSIS — E83.42 HYPOMAGNESEMIA: ICD-10-CM

## 2023-05-09 DIAGNOSIS — I95.1 ORTHOSTATIC HYPOTENSION: Primary | ICD-10-CM

## 2023-05-09 LAB
ALBUMIN SERPL-MCNC: 4.2 G/DL (ref 3.4–5)
ALBUMIN/GLOB SERPL: 1.5 {RATIO} (ref 1.1–2.2)
ALP SERPL-CCNC: 106 U/L (ref 40–129)
ALT SERPL-CCNC: 73 U/L (ref 10–40)
ANION GAP SERPL CALCULATED.3IONS-SCNC: 12 MMOL/L (ref 3–16)
ANION GAP SERPL CALCULATED.3IONS-SCNC: 18 MMOL/L (ref 3–16)
AST SERPL-CCNC: 82 U/L (ref 15–37)
BASOPHILS # BLD: 0 K/UL (ref 0–0.2)
BASOPHILS NFR BLD: 0.7 %
BILIRUB SERPL-MCNC: 0.7 MG/DL (ref 0–1)
BILIRUB UR QL STRIP.AUTO: NEGATIVE
BUN SERPL-MCNC: 17 MG/DL (ref 7–20)
BUN SERPL-MCNC: 18 MG/DL (ref 7–20)
CALCIUM SERPL-MCNC: 9.2 MG/DL (ref 8.3–10.6)
CALCIUM SERPL-MCNC: 9.7 MG/DL (ref 8.3–10.6)
CHLORIDE SERPL-SCNC: 90 MMOL/L (ref 99–110)
CHLORIDE SERPL-SCNC: 90 MMOL/L (ref 99–110)
CLARITY UR: CLEAR
CO2 SERPL-SCNC: 25 MMOL/L (ref 21–32)
CO2 SERPL-SCNC: 27 MMOL/L (ref 21–32)
COLOR UR: YELLOW
CREAT SERPL-MCNC: 1 MG/DL (ref 0.8–1.3)
CREAT SERPL-MCNC: 1.1 MG/DL (ref 0.8–1.3)
DEPRECATED RDW RBC AUTO: 14.3 % (ref 12.4–15.4)
EOSINOPHIL # BLD: 0 K/UL (ref 0–0.6)
EOSINOPHIL NFR BLD: 0.9 %
ETHANOLAMINE SERPL-MCNC: 14 MG/DL (ref 0–0.08)
GFR SERPLBLD CREATININE-BSD FMLA CKD-EPI: >60 ML/MIN/{1.73_M2}
GFR SERPLBLD CREATININE-BSD FMLA CKD-EPI: >60 ML/MIN/{1.73_M2}
GLUCOSE SERPL-MCNC: 128 MG/DL (ref 70–99)
GLUCOSE SERPL-MCNC: 164 MG/DL (ref 70–99)
GLUCOSE UR STRIP.AUTO-MCNC: NEGATIVE MG/DL
HCT VFR BLD AUTO: 35.4 % (ref 40.5–52.5)
HGB BLD-MCNC: 11.8 G/DL (ref 13.5–17.5)
HGB UR QL STRIP.AUTO: NEGATIVE
KETONES UR STRIP.AUTO-MCNC: NEGATIVE MG/DL
LEUKOCYTE ESTERASE UR QL STRIP.AUTO: NEGATIVE
LYMPHOCYTES # BLD: 1 K/UL (ref 1–5.1)
LYMPHOCYTES NFR BLD: 18.7 %
MAGNESIUM SERPL-MCNC: 1.4 MG/DL (ref 1.8–2.4)
MAGNESIUM SERPL-MCNC: 2.3 MG/DL (ref 1.8–2.4)
MCH RBC QN AUTO: 29 PG (ref 26–34)
MCHC RBC AUTO-ENTMCNC: 33.5 G/DL (ref 31–36)
MCV RBC AUTO: 86.6 FL (ref 80–100)
MONOCYTES # BLD: 0.4 K/UL (ref 0–1.3)
MONOCYTES NFR BLD: 7.7 %
NEUTROPHILS # BLD: 4 K/UL (ref 1.7–7.7)
NEUTROPHILS NFR BLD: 72 %
NITRITE UR QL STRIP.AUTO: NEGATIVE
PH UR STRIP.AUTO: 6 [PH] (ref 5–8)
PLATELET # BLD AUTO: 137 K/UL (ref 135–450)
PMV BLD AUTO: 8.1 FL (ref 5–10.5)
POTASSIUM SERPL-SCNC: 2.8 MMOL/L (ref 3.5–5.1)
POTASSIUM SERPL-SCNC: 3.4 MMOL/L (ref 3.5–5.1)
PROT SERPL-MCNC: 7 G/DL (ref 6.4–8.2)
PROT UR STRIP.AUTO-MCNC: NEGATIVE MG/DL
RBC # BLD AUTO: 4.08 M/UL (ref 4.2–5.9)
SODIUM SERPL-SCNC: 129 MMOL/L (ref 136–145)
SODIUM SERPL-SCNC: 133 MMOL/L (ref 136–145)
SP GR UR STRIP.AUTO: 1.01 (ref 1–1.03)
UA COMPLETE W REFLEX CULTURE PNL UR: NORMAL
UA DIPSTICK W REFLEX MICRO PNL UR: NORMAL
URN SPEC COLLECT METH UR: NORMAL
UROBILINOGEN UR STRIP-ACNC: 0.2 E.U./DL
WBC # BLD AUTO: 5.6 K/UL (ref 4–11)

## 2023-05-09 PROCEDURE — 6360000002 HC RX W HCPCS: Performed by: PHYSICIAN ASSISTANT

## 2023-05-09 PROCEDURE — 83735 ASSAY OF MAGNESIUM: CPT

## 2023-05-09 PROCEDURE — 2580000003 HC RX 258: Performed by: PHYSICIAN ASSISTANT

## 2023-05-09 PROCEDURE — 80053 COMPREHEN METABOLIC PANEL: CPT

## 2023-05-09 PROCEDURE — 36415 COLL VENOUS BLD VENIPUNCTURE: CPT

## 2023-05-09 PROCEDURE — 82077 ASSAY SPEC XCP UR&BREATH IA: CPT

## 2023-05-09 PROCEDURE — 85025 COMPLETE CBC W/AUTO DIFF WBC: CPT

## 2023-05-09 PROCEDURE — 96361 HYDRATE IV INFUSION ADD-ON: CPT

## 2023-05-09 PROCEDURE — 99284 EMERGENCY DEPT VISIT MOD MDM: CPT

## 2023-05-09 PROCEDURE — 6370000000 HC RX 637 (ALT 250 FOR IP): Performed by: PHYSICIAN ASSISTANT

## 2023-05-09 PROCEDURE — 93005 ELECTROCARDIOGRAM TRACING: CPT | Performed by: PHYSICIAN ASSISTANT

## 2023-05-09 PROCEDURE — 96366 THER/PROPH/DIAG IV INF ADDON: CPT

## 2023-05-09 PROCEDURE — 81003 URINALYSIS AUTO W/O SCOPE: CPT

## 2023-05-09 PROCEDURE — 96365 THER/PROPH/DIAG IV INF INIT: CPT

## 2023-05-09 PROCEDURE — 96368 THER/DIAG CONCURRENT INF: CPT

## 2023-05-09 RX ORDER — LORAZEPAM 2 MG/ML
2 INJECTION INTRAMUSCULAR
Status: DISCONTINUED | OUTPATIENT
Start: 2023-05-09 | End: 2023-05-10 | Stop reason: HOSPADM

## 2023-05-09 RX ORDER — LORAZEPAM 2 MG/1
2 TABLET ORAL
Status: DISCONTINUED | OUTPATIENT
Start: 2023-05-09 | End: 2023-05-10 | Stop reason: HOSPADM

## 2023-05-09 RX ORDER — AMLODIPINE BESYLATE 2.5 MG/1
2.5 TABLET ORAL DAILY
COMMUNITY

## 2023-05-09 RX ORDER — MULTIVIT-MINERALS/FA/LYCOPENE 0.4 MG-6
1 TABLET ORAL DAILY
Qty: 365 TABLET | Refills: 0 | Status: SHIPPED | OUTPATIENT
Start: 2023-05-09 | End: 2024-05-08

## 2023-05-09 RX ORDER — LORAZEPAM 2 MG/ML
3 INJECTION INTRAMUSCULAR
Status: DISCONTINUED | OUTPATIENT
Start: 2023-05-09 | End: 2023-05-10 | Stop reason: HOSPADM

## 2023-05-09 RX ORDER — POTASSIUM CHLORIDE 7.45 MG/ML
10 INJECTION INTRAVENOUS ONCE
Status: COMPLETED | OUTPATIENT
Start: 2023-05-09 | End: 2023-05-09

## 2023-05-09 RX ORDER — LORAZEPAM 1 MG/1
1 TABLET ORAL
Status: DISCONTINUED | OUTPATIENT
Start: 2023-05-09 | End: 2023-05-10 | Stop reason: HOSPADM

## 2023-05-09 RX ORDER — LORAZEPAM 2 MG/ML
1 INJECTION INTRAMUSCULAR
Status: DISCONTINUED | OUTPATIENT
Start: 2023-05-09 | End: 2023-05-10 | Stop reason: HOSPADM

## 2023-05-09 RX ORDER — 0.9 % SODIUM CHLORIDE 0.9 %
1000 INTRAVENOUS SOLUTION INTRAVENOUS ONCE
Status: COMPLETED | OUTPATIENT
Start: 2023-05-09 | End: 2023-05-09

## 2023-05-09 RX ORDER — LANOLIN ALCOHOL/MO/W.PET/CERES
100 CREAM (GRAM) TOPICAL DAILY
Status: DISCONTINUED | OUTPATIENT
Start: 2023-05-09 | End: 2023-05-10 | Stop reason: HOSPADM

## 2023-05-09 RX ORDER — SODIUM CHLORIDE 0.9 % (FLUSH) 0.9 %
5-40 SYRINGE (ML) INJECTION PRN
Status: DISCONTINUED | OUTPATIENT
Start: 2023-05-09 | End: 2023-05-10 | Stop reason: HOSPADM

## 2023-05-09 RX ORDER — LORAZEPAM 2 MG/1
4 TABLET ORAL
Status: DISCONTINUED | OUTPATIENT
Start: 2023-05-09 | End: 2023-05-10 | Stop reason: HOSPADM

## 2023-05-09 RX ORDER — SODIUM CHLORIDE 0.9 % (FLUSH) 0.9 %
5-40 SYRINGE (ML) INJECTION EVERY 12 HOURS SCHEDULED
Status: DISCONTINUED | OUTPATIENT
Start: 2023-05-09 | End: 2023-05-10 | Stop reason: HOSPADM

## 2023-05-09 RX ORDER — POTASSIUM CHLORIDE 20 MEQ/1
40 TABLET, EXTENDED RELEASE ORAL ONCE
Status: COMPLETED | OUTPATIENT
Start: 2023-05-09 | End: 2023-05-09

## 2023-05-09 RX ORDER — MAGNESIUM SULFATE IN WATER 40 MG/ML
4000 INJECTION, SOLUTION INTRAVENOUS ONCE
Status: COMPLETED | OUTPATIENT
Start: 2023-05-09 | End: 2023-05-09

## 2023-05-09 RX ORDER — SODIUM CHLORIDE 9 MG/ML
INJECTION, SOLUTION INTRAVENOUS PRN
Status: DISCONTINUED | OUTPATIENT
Start: 2023-05-09 | End: 2023-05-10 | Stop reason: HOSPADM

## 2023-05-09 RX ORDER — LORAZEPAM 2 MG/ML
4 INJECTION INTRAMUSCULAR
Status: DISCONTINUED | OUTPATIENT
Start: 2023-05-09 | End: 2023-05-10 | Stop reason: HOSPADM

## 2023-05-09 RX ADMIN — POTASSIUM CHLORIDE 10 MEQ: 7.46 INJECTION, SOLUTION INTRAVENOUS at 20:50

## 2023-05-09 RX ADMIN — SODIUM CHLORIDE 1000 ML: 9 INJECTION, SOLUTION INTRAVENOUS at 19:24

## 2023-05-09 RX ADMIN — MAGNESIUM SULFATE HEPTAHYDRATE 4000 MG: 40 INJECTION, SOLUTION INTRAVENOUS at 20:49

## 2023-05-09 RX ADMIN — POTASSIUM CHLORIDE 40 MEQ: 1500 TABLET, EXTENDED RELEASE ORAL at 20:46

## 2023-05-09 RX ADMIN — Medication 100 MG: at 21:53

## 2023-05-09 ASSESSMENT — LIFESTYLE VARIABLES
HOW OFTEN DO YOU HAVE A DRINK CONTAINING ALCOHOL: 4 OR MORE TIMES A WEEK
HOW MANY STANDARD DRINKS CONTAINING ALCOHOL DO YOU HAVE ON A TYPICAL DAY: 3 OR 4

## 2023-05-09 ASSESSMENT — PAIN - FUNCTIONAL ASSESSMENT: PAIN_FUNCTIONAL_ASSESSMENT: NONE - DENIES PAIN

## 2023-05-09 NOTE — ED PROVIDER NOTES
significant electrolyte derangements and his symptoms however he declined noting that he does not want to be admitted he does not want go to alcohol detox currently. Patient will be discharged home with instruction follow-up with his PCP in a couple days to be started on a daily multivitamin and strict ER precautions were provided. Disposition Considerations (include 1 Tests not done, Shared Decision Making, Pt Expectation of Test or Tx.): Pt is in agreement with the current plan and all questions were addressed. Appropriate for outpatient management        I am the Primary Clinician of Record. FINAL IMPRESSION      1. Orthostatic hypotension    2. Hypokalemia    3.  Hypomagnesemia          DISPOSITION/PLAN     DISPOSITION Decision To Discharge 05/09/2023 11:36:44 PM      PATIENT REF[facility]:  [facility]  [address] [facility]  [phone]  Go to   If symptoms worsen    Your PCP    Schedule an appointment as soon as possible for a visit   For a recheck in  3  days      DISCHARGE MEDICATIONS:  New Prescriptions    MULTIPLE VITAMINS-MINERALS (QC MENS DAILY MULTIVITAMIN) TABS    Take 1 tablet by mouth daily       DISCONTINUED MEDICATIONS:  Discontinued Medications    No medications on file              (Please note that portions of this note were completed with a voice recognition program.  Efforts were made to edit the dictations but occasionally words are mis-transcribed.)    Ann-Marie Churchill (electronically signed)              Ann-Marie Churchill  05/09/23 [id number]

## 2023-05-10 LAB
EKG ATRIAL RATE: 95 BPM
EKG DIAGNOSIS: NORMAL
EKG P AXIS: 37 DEGREES
EKG P-R INTERVAL: 146 MS
EKG Q-T INTERVAL: 376 MS
EKG QRS DURATION: 74 MS
EKG QTC CALCULATION (BAZETT): 472 MS
EKG R AXIS: -24 DEGREES
EKG T AXIS: 2 DEGREES
EKG VENTRICULAR RATE: 95 BPM

## 2023-05-10 PROCEDURE — 93010 ELECTROCARDIOGRAM REPORT: CPT | Performed by: INTERNAL MEDICINE

## 2024-07-13 ENCOUNTER — APPOINTMENT (OUTPATIENT)
Dept: GENERAL RADIOLOGY | Age: 64
DRG: 312 | End: 2024-07-13
Payer: COMMERCIAL

## 2024-07-13 ENCOUNTER — HOSPITAL ENCOUNTER (INPATIENT)
Age: 64
LOS: 1 days | Discharge: HOME | DRG: 312 | End: 2024-07-14
Attending: STUDENT IN AN ORGANIZED HEALTH CARE EDUCATION/TRAINING PROGRAM | Admitting: STUDENT IN AN ORGANIZED HEALTH CARE EDUCATION/TRAINING PROGRAM
Payer: COMMERCIAL

## 2024-07-13 ENCOUNTER — APPOINTMENT (OUTPATIENT)
Dept: CT IMAGING | Age: 64
DRG: 312 | End: 2024-07-13
Payer: COMMERCIAL

## 2024-07-13 DIAGNOSIS — F10.10 ALCOHOL ABUSE: ICD-10-CM

## 2024-07-13 DIAGNOSIS — R79.89 ELEVATED D-DIMER: ICD-10-CM

## 2024-07-13 DIAGNOSIS — E83.42 HYPOMAGNESEMIA: ICD-10-CM

## 2024-07-13 DIAGNOSIS — R29.6 FREQUENT FALLS: ICD-10-CM

## 2024-07-13 DIAGNOSIS — R07.9 CHEST PAIN, UNSPECIFIED TYPE: Primary | ICD-10-CM

## 2024-07-13 DIAGNOSIS — R55 SYNCOPE AND COLLAPSE: ICD-10-CM

## 2024-07-13 DIAGNOSIS — E87.6 HYPOKALEMIA: ICD-10-CM

## 2024-07-13 PROBLEM — I95.1 ORTHOSTATIC HYPOTENSION: Status: ACTIVE | Noted: 2024-07-13

## 2024-07-13 LAB
ALBUMIN SERPL-MCNC: 4.2 G/DL (ref 3.4–5)
ALBUMIN/GLOB SERPL: 1.4 {RATIO} (ref 1.1–2.2)
ALP SERPL-CCNC: 229 U/L (ref 40–129)
ALT SERPL-CCNC: 82 U/L (ref 10–40)
ANION GAP SERPL CALCULATED.3IONS-SCNC: 17 MMOL/L (ref 3–16)
ANION GAP SERPL CALCULATED.3IONS-SCNC: 20 MMOL/L (ref 3–16)
AST SERPL-CCNC: 141 U/L (ref 15–37)
BASOPHILS # BLD: 0 K/UL (ref 0–0.2)
BASOPHILS NFR BLD: 0.5 %
BILIRUB SERPL-MCNC: 0.9 MG/DL (ref 0–1)
BUN SERPL-MCNC: 10 MG/DL (ref 7–20)
BUN SERPL-MCNC: 7 MG/DL (ref 7–20)
CALCIUM SERPL-MCNC: 9.2 MG/DL (ref 8.3–10.6)
CALCIUM SERPL-MCNC: 9.8 MG/DL (ref 8.3–10.6)
CHLORIDE SERPL-SCNC: 98 MMOL/L (ref 99–110)
CHLORIDE SERPL-SCNC: 98 MMOL/L (ref 99–110)
CO2 SERPL-SCNC: 22 MMOL/L (ref 21–32)
CO2 SERPL-SCNC: 26 MMOL/L (ref 21–32)
CREAT SERPL-MCNC: 0.6 MG/DL (ref 0.8–1.3)
CREAT SERPL-MCNC: 0.7 MG/DL (ref 0.8–1.3)
D-DIMER QUANTITATIVE: 1.35 UG/ML FEU (ref 0–0.6)
DEPRECATED RDW RBC AUTO: 13.9 % (ref 12.4–15.4)
EKG ATRIAL RATE: 90 BPM
EKG DIAGNOSIS: NORMAL
EKG P AXIS: 38 DEGREES
EKG P-R INTERVAL: 136 MS
EKG Q-T INTERVAL: 386 MS
EKG QRS DURATION: 78 MS
EKG QTC CALCULATION (BAZETT): 472 MS
EKG R AXIS: -34 DEGREES
EKG T AXIS: 19 DEGREES
EKG VENTRICULAR RATE: 90 BPM
EOSINOPHIL # BLD: 0.1 K/UL (ref 0–0.6)
EOSINOPHIL NFR BLD: 1.7 %
ETHANOLAMINE SERPL-MCNC: 195 MG/DL (ref 0–0.08)
GFR SERPLBLD CREATININE-BSD FMLA CKD-EPI: >90 ML/MIN/{1.73_M2}
GFR SERPLBLD CREATININE-BSD FMLA CKD-EPI: >90 ML/MIN/{1.73_M2}
GLUCOSE SERPL-MCNC: 124 MG/DL (ref 70–99)
GLUCOSE SERPL-MCNC: 85 MG/DL (ref 70–99)
HCT VFR BLD AUTO: 39.2 % (ref 40.5–52.5)
HGB BLD-MCNC: 13.4 G/DL (ref 13.5–17.5)
LACTATE BLDV-SCNC: 2.6 MMOL/L (ref 0.4–2)
LACTATE BLDV-SCNC: 2.7 MMOL/L (ref 0.4–1.9)
LYMPHOCYTES # BLD: 1.2 K/UL (ref 1–5.1)
LYMPHOCYTES NFR BLD: 32.3 %
MAGNESIUM SERPL-MCNC: 1.6 MG/DL (ref 1.8–2.4)
MAGNESIUM SERPL-MCNC: 1.8 MG/DL (ref 1.8–2.4)
MCH RBC QN AUTO: 30.7 PG (ref 26–34)
MCHC RBC AUTO-ENTMCNC: 34.1 G/DL (ref 31–36)
MCV RBC AUTO: 90 FL (ref 80–100)
MONOCYTES # BLD: 0.4 K/UL (ref 0–1.3)
MONOCYTES NFR BLD: 11.1 %
NEUTROPHILS # BLD: 2 K/UL (ref 1.7–7.7)
NEUTROPHILS NFR BLD: 54.4 %
NT-PROBNP SERPL-MCNC: 55 PG/ML (ref 0–124)
PLATELET # BLD AUTO: 155 K/UL (ref 135–450)
PMV BLD AUTO: 8.6 FL (ref 5–10.5)
POTASSIUM SERPL-SCNC: 3.2 MMOL/L (ref 3.5–5.1)
POTASSIUM SERPL-SCNC: 3.5 MMOL/L (ref 3.5–5.1)
PROT SERPL-MCNC: 7.1 G/DL (ref 6.4–8.2)
RBC # BLD AUTO: 4.36 M/UL (ref 4.2–5.9)
SODIUM SERPL-SCNC: 140 MMOL/L (ref 136–145)
SODIUM SERPL-SCNC: 141 MMOL/L (ref 136–145)
TROPONIN, HIGH SENSITIVITY: 14 NG/L (ref 0–22)
TROPONIN, HIGH SENSITIVITY: 15 NG/L (ref 0–22)
TROPONIN, HIGH SENSITIVITY: 16 NG/L (ref 0–22)
TSH SERPL DL<=0.005 MIU/L-ACNC: 0.76 UIU/ML (ref 0.27–4.2)
WBC # BLD AUTO: 3.7 K/UL (ref 4–11)

## 2024-07-13 PROCEDURE — 96372 THER/PROPH/DIAG INJ SC/IM: CPT

## 2024-07-13 PROCEDURE — 82077 ASSAY SPEC XCP UR&BREATH IA: CPT

## 2024-07-13 PROCEDURE — 83605 ASSAY OF LACTIC ACID: CPT

## 2024-07-13 PROCEDURE — 2580000003 HC RX 258: Performed by: INTERNAL MEDICINE

## 2024-07-13 PROCEDURE — 99232 SBSQ HOSP IP/OBS MODERATE 35: CPT | Performed by: INTERNAL MEDICINE

## 2024-07-13 PROCEDURE — 80053 COMPREHEN METABOLIC PANEL: CPT

## 2024-07-13 PROCEDURE — 6370000000 HC RX 637 (ALT 250 FOR IP): Performed by: STUDENT IN AN ORGANIZED HEALTH CARE EDUCATION/TRAINING PROGRAM

## 2024-07-13 PROCEDURE — 96365 THER/PROPH/DIAG IV INF INIT: CPT

## 2024-07-13 PROCEDURE — 83735 ASSAY OF MAGNESIUM: CPT

## 2024-07-13 PROCEDURE — 71260 CT THORAX DX C+: CPT

## 2024-07-13 PROCEDURE — G0378 HOSPITAL OBSERVATION PER HR: HCPCS

## 2024-07-13 PROCEDURE — 96361 HYDRATE IV INFUSION ADD-ON: CPT

## 2024-07-13 PROCEDURE — 84443 ASSAY THYROID STIM HORMONE: CPT

## 2024-07-13 PROCEDURE — 6360000004 HC RX CONTRAST MEDICATION: Performed by: STUDENT IN AN ORGANIZED HEALTH CARE EDUCATION/TRAINING PROGRAM

## 2024-07-13 PROCEDURE — 83880 ASSAY OF NATRIURETIC PEPTIDE: CPT

## 2024-07-13 PROCEDURE — 6360000002 HC RX W HCPCS: Performed by: STUDENT IN AN ORGANIZED HEALTH CARE EDUCATION/TRAINING PROGRAM

## 2024-07-13 PROCEDURE — 36415 COLL VENOUS BLD VENIPUNCTURE: CPT

## 2024-07-13 PROCEDURE — 93010 ELECTROCARDIOGRAM REPORT: CPT | Performed by: INTERNAL MEDICINE

## 2024-07-13 PROCEDURE — 87040 BLOOD CULTURE FOR BACTERIA: CPT

## 2024-07-13 PROCEDURE — 85379 FIBRIN DEGRADATION QUANT: CPT

## 2024-07-13 PROCEDURE — 93005 ELECTROCARDIOGRAM TRACING: CPT | Performed by: STUDENT IN AN ORGANIZED HEALTH CARE EDUCATION/TRAINING PROGRAM

## 2024-07-13 PROCEDURE — 85025 COMPLETE CBC W/AUTO DIFF WBC: CPT

## 2024-07-13 PROCEDURE — 84484 ASSAY OF TROPONIN QUANT: CPT

## 2024-07-13 PROCEDURE — 99285 EMERGENCY DEPT VISIT HI MDM: CPT

## 2024-07-13 PROCEDURE — 1200000000 HC SEMI PRIVATE

## 2024-07-13 PROCEDURE — 2580000003 HC RX 258: Performed by: STUDENT IN AN ORGANIZED HEALTH CARE EDUCATION/TRAINING PROGRAM

## 2024-07-13 PROCEDURE — 71046 X-RAY EXAM CHEST 2 VIEWS: CPT

## 2024-07-13 PROCEDURE — 70450 CT HEAD/BRAIN W/O DYE: CPT

## 2024-07-13 RX ORDER — ATORVASTATIN CALCIUM 40 MG/1
40 TABLET, FILM COATED ORAL NIGHTLY
Status: DISCONTINUED | OUTPATIENT
Start: 2024-07-13 | End: 2024-07-14 | Stop reason: HOSPADM

## 2024-07-13 RX ORDER — ASPIRIN 81 MG/1
81 TABLET, CHEWABLE ORAL DAILY
Status: DISCONTINUED | OUTPATIENT
Start: 2024-07-14 | End: 2024-07-14 | Stop reason: HOSPADM

## 2024-07-13 RX ORDER — ACETAMINOPHEN 650 MG/1
650 SUPPOSITORY RECTAL EVERY 6 HOURS PRN
Status: DISCONTINUED | OUTPATIENT
Start: 2024-07-13 | End: 2024-07-14 | Stop reason: HOSPADM

## 2024-07-13 RX ORDER — SODIUM CHLORIDE 0.9 % (FLUSH) 0.9 %
5-40 SYRINGE (ML) INJECTION EVERY 12 HOURS SCHEDULED
Status: DISCONTINUED | OUTPATIENT
Start: 2024-07-13 | End: 2024-07-14 | Stop reason: HOSPADM

## 2024-07-13 RX ORDER — SODIUM CHLORIDE 9 MG/ML
INJECTION, SOLUTION INTRAVENOUS PRN
Status: DISCONTINUED | OUTPATIENT
Start: 2024-07-13 | End: 2024-07-14 | Stop reason: HOSPADM

## 2024-07-13 RX ORDER — POTASSIUM CHLORIDE 20 MEQ/1
40 TABLET, EXTENDED RELEASE ORAL PRN
Status: DISCONTINUED | OUTPATIENT
Start: 2024-07-13 | End: 2024-07-14

## 2024-07-13 RX ORDER — MAGNESIUM SULFATE 1 G/100ML
1000 INJECTION INTRAVENOUS ONCE
Status: COMPLETED | OUTPATIENT
Start: 2024-07-13 | End: 2024-07-13

## 2024-07-13 RX ORDER — MAGNESIUM SULFATE IN WATER 40 MG/ML
2000 INJECTION, SOLUTION INTRAVENOUS PRN
Status: DISCONTINUED | OUTPATIENT
Start: 2024-07-13 | End: 2024-07-14 | Stop reason: HOSPADM

## 2024-07-13 RX ORDER — SODIUM CHLORIDE 0.9 % (FLUSH) 0.9 %
5-40 SYRINGE (ML) INJECTION PRN
Status: DISCONTINUED | OUTPATIENT
Start: 2024-07-13 | End: 2024-07-14 | Stop reason: HOSPADM

## 2024-07-13 RX ORDER — LORAZEPAM 2 MG/1
4 TABLET ORAL
Status: DISCONTINUED | OUTPATIENT
Start: 2024-07-13 | End: 2024-07-14 | Stop reason: HOSPADM

## 2024-07-13 RX ORDER — LORAZEPAM 1 MG/1
1 TABLET ORAL
Status: DISCONTINUED | OUTPATIENT
Start: 2024-07-13 | End: 2024-07-14 | Stop reason: HOSPADM

## 2024-07-13 RX ORDER — ENOXAPARIN SODIUM 100 MG/ML
40 INJECTION SUBCUTANEOUS DAILY
Status: DISCONTINUED | OUTPATIENT
Start: 2024-07-13 | End: 2024-07-14 | Stop reason: HOSPADM

## 2024-07-13 RX ORDER — ACETAMINOPHEN 325 MG/1
650 TABLET ORAL EVERY 6 HOURS PRN
Status: DISCONTINUED | OUTPATIENT
Start: 2024-07-13 | End: 2024-07-14 | Stop reason: HOSPADM

## 2024-07-13 RX ORDER — LORAZEPAM 2 MG/ML
2 INJECTION INTRAMUSCULAR
Status: DISCONTINUED | OUTPATIENT
Start: 2024-07-13 | End: 2024-07-14 | Stop reason: HOSPADM

## 2024-07-13 RX ORDER — LORAZEPAM 2 MG/ML
4 INJECTION INTRAMUSCULAR
Status: DISCONTINUED | OUTPATIENT
Start: 2024-07-13 | End: 2024-07-14 | Stop reason: HOSPADM

## 2024-07-13 RX ORDER — ONDANSETRON 2 MG/ML
4 INJECTION INTRAMUSCULAR; INTRAVENOUS EVERY 6 HOURS PRN
Status: DISCONTINUED | OUTPATIENT
Start: 2024-07-13 | End: 2024-07-14 | Stop reason: HOSPADM

## 2024-07-13 RX ORDER — ONDANSETRON 4 MG/1
4 TABLET, ORALLY DISINTEGRATING ORAL EVERY 8 HOURS PRN
Status: DISCONTINUED | OUTPATIENT
Start: 2024-07-13 | End: 2024-07-14 | Stop reason: HOSPADM

## 2024-07-13 RX ORDER — LORAZEPAM 2 MG/1
2 TABLET ORAL
Status: DISCONTINUED | OUTPATIENT
Start: 2024-07-13 | End: 2024-07-14 | Stop reason: HOSPADM

## 2024-07-13 RX ORDER — LANOLIN ALCOHOL/MO/W.PET/CERES
100 CREAM (GRAM) TOPICAL DAILY
Status: DISCONTINUED | OUTPATIENT
Start: 2024-07-13 | End: 2024-07-14 | Stop reason: HOSPADM

## 2024-07-13 RX ORDER — SODIUM CHLORIDE, SODIUM LACTATE, POTASSIUM CHLORIDE, CALCIUM CHLORIDE 600; 310; 30; 20 MG/100ML; MG/100ML; MG/100ML; MG/100ML
INJECTION, SOLUTION INTRAVENOUS CONTINUOUS
Status: DISCONTINUED | OUTPATIENT
Start: 2024-07-13 | End: 2024-07-14

## 2024-07-13 RX ORDER — 0.9 % SODIUM CHLORIDE 0.9 %
1000 INTRAVENOUS SOLUTION INTRAVENOUS ONCE
Status: COMPLETED | OUTPATIENT
Start: 2024-07-13 | End: 2024-07-13

## 2024-07-13 RX ORDER — POTASSIUM CHLORIDE 20 MEQ/1
40 TABLET, EXTENDED RELEASE ORAL ONCE
Status: COMPLETED | OUTPATIENT
Start: 2024-07-13 | End: 2024-07-13

## 2024-07-13 RX ORDER — POTASSIUM CHLORIDE 7.45 MG/ML
10 INJECTION INTRAVENOUS PRN
Status: DISCONTINUED | OUTPATIENT
Start: 2024-07-13 | End: 2024-07-14

## 2024-07-13 RX ORDER — ACETAMINOPHEN 325 MG/1
650 TABLET ORAL EVERY 6 HOURS PRN
COMMUNITY

## 2024-07-13 RX ORDER — POLYETHYLENE GLYCOL 3350 17 G/17G
17 POWDER, FOR SOLUTION ORAL DAILY PRN
Status: DISCONTINUED | OUTPATIENT
Start: 2024-07-13 | End: 2024-07-14 | Stop reason: HOSPADM

## 2024-07-13 RX ORDER — LORAZEPAM 2 MG/ML
3 INJECTION INTRAMUSCULAR
Status: DISCONTINUED | OUTPATIENT
Start: 2024-07-13 | End: 2024-07-14 | Stop reason: HOSPADM

## 2024-07-13 RX ORDER — FOLIC ACID 1 MG/1
1 TABLET ORAL DAILY
Status: DISCONTINUED | OUTPATIENT
Start: 2024-07-13 | End: 2024-07-14 | Stop reason: HOSPADM

## 2024-07-13 RX ORDER — LORAZEPAM 2 MG/ML
1 INJECTION INTRAMUSCULAR
Status: DISCONTINUED | OUTPATIENT
Start: 2024-07-13 | End: 2024-07-14 | Stop reason: HOSPADM

## 2024-07-13 RX ADMIN — LORAZEPAM 1 MG: 1 TABLET ORAL at 15:37

## 2024-07-13 RX ADMIN — ALUMINUM HYDROXIDE, MAGNESIUM HYDROXIDE, AND SIMETHICONE: 1200; 120; 1200 SUSPENSION ORAL at 02:33

## 2024-07-13 RX ADMIN — LORAZEPAM 2 MG: 2 TABLET ORAL at 10:38

## 2024-07-13 RX ADMIN — LORAZEPAM 1 MG: 1 TABLET ORAL at 07:59

## 2024-07-13 RX ADMIN — SODIUM CHLORIDE, POTASSIUM CHLORIDE, SODIUM LACTATE AND CALCIUM CHLORIDE: 600; 310; 30; 20 INJECTION, SOLUTION INTRAVENOUS at 13:18

## 2024-07-13 RX ADMIN — POTASSIUM CHLORIDE 40 MEQ: 1500 TABLET, EXTENDED RELEASE ORAL at 02:33

## 2024-07-13 RX ADMIN — SODIUM CHLORIDE 1000 ML: 9 INJECTION, SOLUTION INTRAVENOUS at 06:38

## 2024-07-13 RX ADMIN — Medication 100 MG: at 10:25

## 2024-07-13 RX ADMIN — ATORVASTATIN CALCIUM 40 MG: 40 TABLET, FILM COATED ORAL at 20:38

## 2024-07-13 RX ADMIN — MAGNESIUM SULFATE IN DEXTROSE 1000 MG: 10 INJECTION, SOLUTION INTRAVENOUS at 02:38

## 2024-07-13 RX ADMIN — IOPAMIDOL 75 ML: 755 INJECTION, SOLUTION INTRAVENOUS at 02:12

## 2024-07-13 RX ADMIN — ENOXAPARIN SODIUM 40 MG: 100 INJECTION SUBCUTANEOUS at 10:25

## 2024-07-13 RX ADMIN — Medication 10 ML: at 10:27

## 2024-07-13 RX ADMIN — SODIUM CHLORIDE, POTASSIUM CHLORIDE, SODIUM LACTATE AND CALCIUM CHLORIDE: 600; 310; 30; 20 INJECTION, SOLUTION INTRAVENOUS at 22:32

## 2024-07-13 RX ADMIN — FOLIC ACID 1 MG: 1 TABLET ORAL at 10:25

## 2024-07-13 ASSESSMENT — PAIN DESCRIPTION - DESCRIPTORS: DESCRIPTORS: TIGHTNESS

## 2024-07-13 ASSESSMENT — LIFESTYLE VARIABLES
HOW OFTEN DO YOU HAVE A DRINK CONTAINING ALCOHOL: 4 OR MORE TIMES A WEEK
HOW MANY STANDARD DRINKS CONTAINING ALCOHOL DO YOU HAVE ON A TYPICAL DAY: 7 TO 9

## 2024-07-13 ASSESSMENT — PAIN SCALES - GENERAL: PAINLEVEL_OUTOF10: 8

## 2024-07-13 ASSESSMENT — PAIN - FUNCTIONAL ASSESSMENT: PAIN_FUNCTIONAL_ASSESSMENT: 0-10

## 2024-07-13 ASSESSMENT — PAIN DESCRIPTION - LOCATION: LOCATION: CHEST

## 2024-07-13 NOTE — PLAN OF CARE
Problem: Cardiovascular - Adult  Goal: Maintains optimal cardiac output and hemodynamic stability  Outcome: Progressing  Flowsheets (Taken 7/13/2024 1736)  Maintains optimal cardiac output and hemodynamic stability:   Monitor blood pressure and heart rate   Assess for signs of decreased cardiac output  Note: KERRY Flynn     Problem: Cardiovascular - Adult  Goal: Absence of cardiac dysrhythmias or at baseline  Outcome: Progressing  Flowsheets (Taken 7/13/2024 1736)  Absence of cardiac dysrhythmias or at baseline: Monitor cardiac rate and rhythm  Note: Jamia

## 2024-07-13 NOTE — ED TRIAGE NOTES
Pt presents to ED for CP that began 3 hours ago. He thought it was indigestion and took tums with no relief. PT is a daily drinker.

## 2024-07-13 NOTE — PLAN OF CARE
63 y.o. male with a past medical history of Acid reflux, Hyperlipidemia, Hypertension, and Vertigo.  Presented to ED with midsternal chest pain, shortness of breath, bilateral foot swelling.  Describes multiple episodes of syncope, with 5 falls over last 48 hours.  Has a history of alcohol use.  Admitted for further workup of chest pain, syncope and possible alcohol withdrawal.    Aubrey Chavez MD

## 2024-07-13 NOTE — FLOWSHEET NOTE
07/13/24 1034   CIWA-Ar   /84   Pulse (!) 112   Nausea and Vomiting 0   Tactile Disturbances 0   Tremor 6   Auditory Disturbances 0   Paroxysmal Sweats 0   Visual Disturbances 0   Anxiety 4   Headache, Fullness in Head 0   Agitation 0   Orientation and Clouding of Sensorium 2   CIWA-Ar Total (!) 12     Given 2mg Ativan PO  See flowsheet for follow up

## 2024-07-13 NOTE — ED NOTES
Alert slightly tremulous - CIWA 8 medicated with Ativan 1 mg po.  Report to tele RN - will transport to floor / stretcher.

## 2024-07-13 NOTE — H&P
2 tablets by mouth every 6 hours as needed for Pain   Yes William Oleary MD   amLODIPine (NORVASC) 2.5 MG tablet Take 1 tablet by mouth daily    William Oleary MD   amitriptyline (ELAVIL) 25 MG tablet Take 1 tablet by mouth 12/4/17   William Oleary MD   Olmesartan Medoxomil-HCTZ (BENICAR HCT PO) Take by mouth daily (with breakfast)  40mg/12.5mg    William Oleary MD   pravastatin (PRAVACHOL) 40 MG tablet Take 1 tablet by mouth daily    William Oleary MD   aspirin 81 MG EC tablet Take 1 tablet by mouth daily    William Oleary MD       Allergies:  Bactrim [sulfamethoxazole-trimethoprim]    Social History:      The patient currently lives in private residence     TOBACCO:   reports that he has quit smoking. His smoking use included cigars. He has never used smokeless tobacco.  ETOH:   reports current alcohol use of about 24.0 standard drinks of alcohol per week.      Family History:      Reviewed in detail and positive as follows:        Problem Relation Age of Onset    Diabetes Mother     Stroke Father     Heart Disease Father     Cancer Sister     Diabetes Sister        REVIEW OF SYSTEMS:   Pertinent positives as noted in the HPI. All other systems reviewed and negative.    PHYSICAL EXAM:    /84   Pulse (!) 112   Temp 98 °F (36.7 °C) (Oral)   Resp 18   SpO2 99%     General appearance: No apparent distress, appears stated age and cooperative.  HEENT: Normal cephalic, atraumatic without obvious deformity. Pupils equal, round, and reactive to light. Extra ocular muscles intact. Conjunctivae/corneas clear.  Neck: Supple, with full range of motion. No jugular venous distention. Trachea midline.  Respiratory:  Normal respiratory effort. Clear to auscultation, bilaterally without Rales/Wheezes/Rhonchi.  Cardiovascular: Regular rate and rhythm with normal S1/S2 without murmurs, rubs or gallops.  Abdomen: Soft, non-tender, non-distended with normal bowel

## 2024-07-13 NOTE — FLOWSHEET NOTE
07/13/24 1532   CIWA-Ar   /87   Pulse (!) 111   Nausea and Vomiting 0   Tactile Disturbances 0   Tremor 6   Auditory Disturbances 0   Paroxysmal Sweats 0   Visual Disturbances 0   Anxiety 4   Headache, Fullness in Head 0   Agitation 0   Orientation and Clouding of Sensorium 0   CIWA-Ar Total (!) 10         Patient was given 1mg Ativan PO.  See flowsheet for follow up.

## 2024-07-14 VITALS
OXYGEN SATURATION: 98 % | HEIGHT: 74 IN | RESPIRATION RATE: 18 BRPM | HEART RATE: 105 BPM | WEIGHT: 216.8 LBS | TEMPERATURE: 97.5 F | BODY MASS INDEX: 27.82 KG/M2 | SYSTOLIC BLOOD PRESSURE: 145 MMHG | DIASTOLIC BLOOD PRESSURE: 97 MMHG

## 2024-07-14 LAB
ALBUMIN SERPL-MCNC: 3.5 G/DL (ref 3.4–5)
ALBUMIN/GLOB SERPL: 1.7 {RATIO} (ref 1.1–2.2)
ALP SERPL-CCNC: 190 U/L (ref 40–129)
ALT SERPL-CCNC: 61 U/L (ref 10–40)
ANION GAP SERPL CALCULATED.3IONS-SCNC: 15 MMOL/L (ref 3–16)
AST SERPL-CCNC: 94 U/L (ref 15–37)
BASOPHILS # BLD: 0 K/UL (ref 0–0.2)
BASOPHILS NFR BLD: 0.3 %
BILIRUB SERPL-MCNC: 1 MG/DL (ref 0–1)
BUN SERPL-MCNC: 8 MG/DL (ref 7–20)
CALCIUM SERPL-MCNC: 8.6 MG/DL (ref 8.3–10.6)
CHLORIDE SERPL-SCNC: 99 MMOL/L (ref 99–110)
CO2 SERPL-SCNC: 24 MMOL/L (ref 21–32)
CREAT SERPL-MCNC: 0.6 MG/DL (ref 0.8–1.3)
DEPRECATED RDW RBC AUTO: 13.8 % (ref 12.4–15.4)
EOSINOPHIL # BLD: 0.1 K/UL (ref 0–0.6)
EOSINOPHIL NFR BLD: 1.6 %
GFR SERPLBLD CREATININE-BSD FMLA CKD-EPI: >90 ML/MIN/{1.73_M2}
GLUCOSE SERPL-MCNC: 105 MG/DL (ref 70–99)
HCT VFR BLD AUTO: 34.4 % (ref 40.5–52.5)
HGB BLD-MCNC: 11.5 G/DL (ref 13.5–17.5)
LYMPHOCYTES # BLD: 0.8 K/UL (ref 1–5.1)
LYMPHOCYTES NFR BLD: 20.5 %
MAGNESIUM SERPL-MCNC: 1.4 MG/DL (ref 1.8–2.4)
MCH RBC QN AUTO: 30.4 PG (ref 26–34)
MCHC RBC AUTO-ENTMCNC: 33.4 G/DL (ref 31–36)
MCV RBC AUTO: 90.9 FL (ref 80–100)
MONOCYTES # BLD: 0.5 K/UL (ref 0–1.3)
MONOCYTES NFR BLD: 12.7 %
NEUTROPHILS # BLD: 2.6 K/UL (ref 1.7–7.7)
NEUTROPHILS NFR BLD: 64.9 %
PLATELET # BLD AUTO: 115 K/UL (ref 135–450)
PMV BLD AUTO: 8.7 FL (ref 5–10.5)
POTASSIUM SERPL-SCNC: 3 MMOL/L (ref 3.5–5.1)
PROT SERPL-MCNC: 5.6 G/DL (ref 6.4–8.2)
RBC # BLD AUTO: 3.78 M/UL (ref 4.2–5.9)
SODIUM SERPL-SCNC: 138 MMOL/L (ref 136–145)
WBC # BLD AUTO: 3.9 K/UL (ref 4–11)

## 2024-07-14 PROCEDURE — 6370000000 HC RX 637 (ALT 250 FOR IP): Performed by: STUDENT IN AN ORGANIZED HEALTH CARE EDUCATION/TRAINING PROGRAM

## 2024-07-14 PROCEDURE — 97116 GAIT TRAINING THERAPY: CPT

## 2024-07-14 PROCEDURE — 6360000002 HC RX W HCPCS: Performed by: STUDENT IN AN ORGANIZED HEALTH CARE EDUCATION/TRAINING PROGRAM

## 2024-07-14 PROCEDURE — 6370000000 HC RX 637 (ALT 250 FOR IP): Performed by: INTERNAL MEDICINE

## 2024-07-14 PROCEDURE — 97530 THERAPEUTIC ACTIVITIES: CPT

## 2024-07-14 PROCEDURE — 97166 OT EVAL MOD COMPLEX 45 MIN: CPT

## 2024-07-14 PROCEDURE — 96375 TX/PRO/DX INJ NEW DRUG ADDON: CPT

## 2024-07-14 PROCEDURE — G0378 HOSPITAL OBSERVATION PER HR: HCPCS

## 2024-07-14 PROCEDURE — 96376 TX/PRO/DX INJ SAME DRUG ADON: CPT

## 2024-07-14 PROCEDURE — 96361 HYDRATE IV INFUSION ADD-ON: CPT

## 2024-07-14 PROCEDURE — 97162 PT EVAL MOD COMPLEX 30 MIN: CPT

## 2024-07-14 PROCEDURE — 96366 THER/PROPH/DIAG IV INF ADDON: CPT

## 2024-07-14 PROCEDURE — 83735 ASSAY OF MAGNESIUM: CPT

## 2024-07-14 PROCEDURE — 85025 COMPLETE CBC W/AUTO DIFF WBC: CPT

## 2024-07-14 PROCEDURE — 80053 COMPREHEN METABOLIC PANEL: CPT

## 2024-07-14 PROCEDURE — 97535 SELF CARE MNGMENT TRAINING: CPT

## 2024-07-14 PROCEDURE — 36415 COLL VENOUS BLD VENIPUNCTURE: CPT

## 2024-07-14 PROCEDURE — 99239 HOSP IP/OBS DSCHRG MGMT >30: CPT | Performed by: INTERNAL MEDICINE

## 2024-07-14 RX ORDER — LANOLIN ALCOHOL/MO/W.PET/CERES
100 CREAM (GRAM) TOPICAL DAILY
Qty: 30 TABLET | Refills: 3 | COMMUNITY
Start: 2024-07-15

## 2024-07-14 RX ORDER — POTASSIUM CHLORIDE 20 MEQ/1
40 TABLET, EXTENDED RELEASE ORAL ONCE
Status: COMPLETED | OUTPATIENT
Start: 2024-07-14 | End: 2024-07-14

## 2024-07-14 RX ORDER — OLMESARTAN MEDOXOMIL 40 MG/1
40 TABLET ORAL DAILY
Qty: 30 TABLET | Refills: 0 | Status: SHIPPED | OUTPATIENT
Start: 2024-07-14 | End: 2024-08-13

## 2024-07-14 RX ORDER — LOSARTAN POTASSIUM 50 MG/1
50 TABLET ORAL DAILY
Status: DISCONTINUED | OUTPATIENT
Start: 2024-07-14 | End: 2024-07-14 | Stop reason: HOSPADM

## 2024-07-14 RX ORDER — LANOLIN ALCOHOL/MO/W.PET/CERES
400 CREAM (GRAM) TOPICAL ONCE
Status: COMPLETED | OUTPATIENT
Start: 2024-07-14 | End: 2024-07-14

## 2024-07-14 RX ORDER — MULTIVIT-MIN/FOLIC/VIT K/LYCOP 400-300MCG
1 TABLET ORAL
Qty: 30 TABLET | Refills: 0 | Status: SHIPPED | OUTPATIENT
Start: 2024-07-14 | End: 2024-08-13

## 2024-07-14 RX ORDER — POTASSIUM CHLORIDE 20 MEQ/1
20 TABLET, EXTENDED RELEASE ORAL 2 TIMES DAILY
Qty: 14 TABLET | Refills: 0 | Status: SHIPPED | OUTPATIENT
Start: 2024-07-14 | End: 2024-07-21

## 2024-07-14 RX ORDER — FOLIC ACID 1 MG/1
1 TABLET ORAL DAILY
Qty: 30 TABLET | Refills: 3 | COMMUNITY
Start: 2024-07-15

## 2024-07-14 RX ADMIN — POTASSIUM CHLORIDE 10 MEQ: 7.46 INJECTION, SOLUTION INTRAVENOUS at 07:52

## 2024-07-14 RX ADMIN — LOSARTAN POTASSIUM 50 MG: 50 TABLET, FILM COATED ORAL at 10:41

## 2024-07-14 RX ADMIN — FOLIC ACID 1 MG: 1 TABLET ORAL at 07:54

## 2024-07-14 RX ADMIN — POTASSIUM CHLORIDE 40 MEQ: 1500 TABLET, EXTENDED RELEASE ORAL at 10:41

## 2024-07-14 RX ADMIN — Medication 400 MG: at 10:41

## 2024-07-14 RX ADMIN — POTASSIUM CHLORIDE 10 MEQ: 7.46 INJECTION, SOLUTION INTRAVENOUS at 09:55

## 2024-07-14 RX ADMIN — ASPIRIN 81 MG: 81 TABLET, CHEWABLE ORAL at 07:54

## 2024-07-14 RX ADMIN — POTASSIUM CHLORIDE 10 MEQ: 7.46 INJECTION, SOLUTION INTRAVENOUS at 09:00

## 2024-07-14 RX ADMIN — Medication 100 MG: at 07:54

## 2024-07-14 RX ADMIN — ACETAMINOPHEN 650 MG: 325 TABLET ORAL at 04:37

## 2024-07-14 RX ADMIN — MAGNESIUM SULFATE HEPTAHYDRATE 2000 MG: 40 INJECTION, SOLUTION INTRAVENOUS at 07:39

## 2024-07-14 RX ADMIN — LORAZEPAM 1 MG: 1 TABLET ORAL at 09:06

## 2024-07-14 RX ADMIN — LORAZEPAM 1 MG: 1 TABLET ORAL at 07:59

## 2024-07-14 RX ADMIN — LORAZEPAM 1 MG: 1 TABLET ORAL at 10:09

## 2024-07-14 ASSESSMENT — PAIN SCALES - GENERAL
PAINLEVEL_OUTOF10: 5
PAINLEVEL_OUTOF10: 3
PAINLEVEL_OUTOF10: 5

## 2024-07-14 ASSESSMENT — PAIN SCALES - WONG BAKER: WONGBAKER_NUMERICALRESPONSE: HURTS A LITTLE BIT

## 2024-07-14 ASSESSMENT — PAIN - FUNCTIONAL ASSESSMENT
PAIN_FUNCTIONAL_ASSESSMENT: ACTIVITIES ARE NOT PREVENTED
PAIN_FUNCTIONAL_ASSESSMENT: ACTIVITIES ARE NOT PREVENTED

## 2024-07-14 ASSESSMENT — PAIN DESCRIPTION - LOCATION
LOCATION: WRIST
LOCATION: WRIST

## 2024-07-14 ASSESSMENT — PAIN DESCRIPTION - ORIENTATION
ORIENTATION: LEFT
ORIENTATION: LEFT

## 2024-07-14 ASSESSMENT — PAIN DESCRIPTION - DESCRIPTORS
DESCRIPTORS: ACHING
DESCRIPTORS: ACHING

## 2024-07-14 ASSESSMENT — PAIN DESCRIPTION - PAIN TYPE: TYPE: ACUTE PAIN

## 2024-07-14 NOTE — PROGRESS NOTES
/103, .  Refuses ativan for CIWA. Denies chest pain. MD notified.   and K kirsten in process.  
Bedside report and transfer of care given to JUDY Link. Pt currently resting in bed with the call light within reach. Pt denies any other care needs at this time. Pt stable at this time.   
Consult has been added  to Case management  on 7/13/24. 9:01 AM    Jessica Smims  7/13/2024  
Dr. Burgos notified of elevated lactic of 2.7. Also, asked him to clarify serial EKG orders. Currently, waiting on reply.   
Dr. Burgos replied back concerning serial EKG order clarification. They only need to be done if he is having active chest pain.     Also, bolus ordered for elevated lactic and redraw to be completed after bolus.   
Patient admitted to room 326 from ER. Patient oriented to room, call light, bed rails, phone, lights and bathroom. Patient instructed about the schedule of the day including: vital sign frequency, lab draws, possible tests, frequency of MD and staff rounds, daily weights, I &O's and prescribed diet.  Telemetry box in place, patient aware of placement and reason. Bed locked, in lowest position, side rails up 2/4, call light within reach.        Recliner Assessment  Patient is able to demonstrate the ability to move from a reclining position to an upright position within the recliner.     4 Eyes Skin Assessment     NAME:  Franklin Mahmood  YOB: 1960  MEDICAL RECORD NUMBER:  [id number]    The patient is being assessed for  Admission    I agree that at least one RN has performed a thorough Head to Toe Skin Assessment on the patient. ALL assessment sites listed below have been assessed.      Areas assessed by both nurses:    Head, Face, Ears, Shoulders, Back, Chest, Arms, Elbows, Hands, Sacrum. Buttock, Coccyx, Ischium, Legs. Feet and Heels, and Under Medical Devices         Does the Patient have a Wound? No noted wound(s)       Santos Prevention initiated by RN: No  Wound Care Orders initiated by RN: No    Pressure Injury (Stage 3,4, Unstageable, DTI, NWPT, and Complex wounds) if present, place Wound referral order by RN under : No    New Ostomies, if present place, Ostomy referral order under : No     Patient denies the need for two nurses to assess his skin.    Nurse 1 eSignature: Electronically signed by Evelyn Benavidez RN on 7/13/24 at 9:52 AM EDT        
Tested    Transfer Training     Sit to stand:   Min A  ,several tries from EOB,couch  Stand to sit:   Min A  ,VC for safety and hand placement. Patient was able to demonstrate recall with transfer safety  Bed to/from Chair:  CGA with use of gait belt and rolling walker (RW)    Gait gait completed as indicated below  Distance:      20,40 ft  Deviations (firm surface/linoleum):  decreased curtis, narrow KODI, forward flexed posture, and ataxic pattern  Assistive Device Used:    gait belt and rolling walker (RW)  Level of Assist:    CGA   Comment: patient took ~ 3 steps with no AD, required Mod assist and balance was poor. This improved using RW    Stair Training deferred, pt unsafe/ not appropriate to complete stairs at this time  # of Steps:   N/A  Level of Assist:  Not Tested   UE Support:  NA  Assistive Device:  N/A  Pattern:   N/A  Comments:      Therapeutic Exercises Initiated  deferred secondary to treatment focus on functional mobility  Supine:  N/A    Seated:  N/A    Standing:  N/A      Positioning Needs   Pt up in chair, alarm set, positioned in proper neutral alignment and pressure relief provided.   Call light provided and all needs within reach  RN aware of pt position/status    Other Activities  None.    Patient/Family Education   Pt educated on role of inpatient PT, POC, importance of continued activity, DC recommendations, safety awareness, transfer techniques, and calling for assist with mobility.      Assessment  Pt seen today for physical therapy Evaluation & Treatment. Pt demonstrated decreased Activity tolerance, Balance, Safety, and Strength as well as decreased independence with Ambulation, Bed Mobility , and Transfers. PAtient will benefit from skilled PT to maximize safety and functional mobility while in acute care.    Recommending SNF upon discharge as patient functioning well below baseline, demonstrates good rehab potential and unable to return home due to inability to negotiate stairs to 
from this facility prior to next visit, this note will serve as the Discharge Summary

## 2024-07-14 NOTE — FLOWSHEET NOTE
07/14/24 0739   CIWA-Ar   BP (!) 147/103   Pulse (!) 118   Nausea and Vomiting 0   Tactile Disturbances 0   Tremor 6   Auditory Disturbances 0   Paroxysmal Sweats 0   Visual Disturbances 0   Anxiety 4   Headache, Fullness in Head 0   Agitation 0   Orientation and Clouding of Sensorium 0   CIWA-Ar Total (!) 10     Patient given 1mg Ativan per CIWA.  See flowsheet for follow up.

## 2024-07-14 NOTE — FLOWSHEET NOTE
07/13/24 2000   Vital Signs   Temp 97.4 °F (36.3 °C)   Temp Source Oral   Pulse (!) 102   Heart Rate Source Monitor   Respirations 18   BP (!) 139/93   MAP (Calculated) 108   BP Location Left upper arm   BP Method Automatic   Pain Assessment   Pain Assessment None - Denies Pain   Oxygen Therapy   SpO2 98 %   O2 Device None (Room air)   Rhythm Interpretation   Cardiac Rhythm Sinus rhythm     Shift assessment done  Rx given given per MAR  A/OX4  Call light within reach  Standard safety measures  Pt on room air  Pt resting at bed without any need at the moment  Ciwa score of 2

## 2024-07-14 NOTE — DISCHARGE INSTR - DIET

## 2024-07-14 NOTE — DISCHARGE INSTR - COC
Continuity of Care Form    Patient Name: Franklin Mahmood   :  1960  MRN:  [id number]    Admit date:  2024  Discharge date: 24    Code Status Order: Full Code   Advance Directives:     Admitting Physician:  Aubrey Chavez MD  PCP: Thea Schuler    Discharging Nurse: JUDY Rivas  Discharging Hospital Unit/Room#: /0326-01  Discharging Unit Phone Number: [phone]    Emergency Contact:   Extended Emergency Contact Information  Primary Emergency Contact: Annalisa Mahmood  Address: [address] [place] [address]  Home Phone: [phone]  Relation: Spouse    Past Surgical History:  Past Surgical History:   Procedure Laterality Date    COLONOSCOPY  10/06/2015    divert    HERNIA REPAIR      PRE-MALIGNANT / BENIGN SKIN LESION EXCISION      Lump removed from under left arm    WISDOM TOOTH EXTRACTION      2 extracted and still has 2       Immunization History:   Immunization History   Administered Date(s) Administered    COVID-19, MODERNA Bivalent, (age 12y+), IM, 50 mcg/0.5 mL 2022    COVID-19, PFIZER PURPLE top, DILUTE for use, (age 12 y+), 30mcg/0.3mL 2021, 2021    Influenza Virus Vaccine 10/01/2014    Pneumococcal, PPSV23, PNEUMOVAX 23, (age 2y+), SC/IM, 0.5mL 2014       Active Problems:  Patient Active Problem List   Diagnosis Code    Leukopenia D72.819    Thrombocytopenia (HCC) D69.6    Diarrhea R19.7    Severe sepsis (HCC) A41.9, R65.20    Skin abscess L02.91    Disorder of electrolytes E87.8    Alcohol abuse F10.10    Acute kidney injury (HCC) N17.9    Fever R50.9    Knee swelling M25.469    Primary osteoarthritis of left knee M17.12    Chest pain R07.9    Frequent falls R29.6    Orthostatic hypotension I95.1       Isolation/Infection:   Isolation            No Isolation          Patient Infection Status       None to display            Nurse Assessment:  Last Vital Signs: BP (!) 145/97   Pulse (!) 105   Temp 97.5 °F (36.4

## 2024-07-14 NOTE — PLAN OF CARE
Problem: Safety - Adult  Goal: Free from fall injury  Outcome: Progressing     Problem: Discharge Planning  Goal: Discharge to home or other facility with appropriate resources  7/13/2024 2110 by Justin Irizarry RN  Outcome: Progressing  7/13/2024 1736 by Evelyn Benavidez RN  Outcome: Progressing  Flowsheets (Taken 7/13/2024 1017)  Discharge to home or other facility with appropriate resources: Identify barriers to discharge with patient and caregiver     Problem: Pain  Goal: Verbalizes/displays adequate comfort level or baseline comfort level  Outcome: Progressing     Problem: Risk for Elopement  Goal: Patient will not exit the unit/facility without proper excort  Outcome: Progressing     Problem: Cardiovascular - Adult  Goal: Maintains optimal cardiac output and hemodynamic stability  7/13/2024 2110 by Justin Irizarry RN  Outcome: Progressing  7/13/2024 1736 by Evelyn Benavidez RN  Outcome: Progressing  Flowsheets (Taken 7/13/2024 1736)  Maintains optimal cardiac output and hemodynamic stability:   Monitor blood pressure and heart rate   Assess for signs of decreased cardiac output  Note: VSS  STachy  Goal: Absence of cardiac dysrhythmias or at baseline  7/13/2024 2110 by Justin Irizarry RN  Outcome: Progressing  7/13/2024 1736 by Evelyn Benavidez RN  Outcome: Progressing  Flowsheets (Taken 7/13/2024 1736)  Absence of cardiac dysrhythmias or at baseline: Monitor cardiac rate and rhythm  Note: SR-Isabela

## 2024-07-14 NOTE — CARE COORDINATION
DISCHARGE ORDER  Date/Time 2024 11:53 AM  Completed by: Teresa Boeck, RN, Case Management    Patient Name: Franklin Mahmood      : 1960  Admitting Diagnosis: Syncope and collapse [R55]  Hypokalemia [E87.6]  Hypomagnesemia [E83.42]  Alcohol abuse [F10.10]  Chest pain [R07.9]  Frequent falls [R29.6]  Elevated d-dimer [R79.89]  Chest pain, unspecified type [R07.9]      Admit order Date and Status:  24  (verify MD's last order for status of admission)      Noted discharge order.   If applicable PT/OT recommendation at Discharge: SNF  DME recommendation by PT/OT:Walker (other items patient states will get at [facility])  Confirmed discharge plan: Yes  with the patient and spouse  If pt confirmed DC plan does family need to be contacted by CM No if yes who______  Discharge Plan: The patient is discharging to home today with a rolling walker and is agreeable to home care. CM unable to secure home care at this time. Waiting on return call.    [facility] - not in network  [facility] - Left a   [facility] - LVM - Not in contract  [facility] - could not tell CM if they take insurance or not  [facility] - could not let CM know until tomorrow if they accept insurance    3:40 - Melly with [facility] LVM they can accept and will see the patient tomorrow.     Date of Last IMM Given: NA    Reviewed chart.  Role of discharge planner explained and patient verbalized understanding. Discharge order is noted.    Has Home O2 in place on admit:  No  Informed of need to bring portable home O2 tank on day of discharge for nursing to connect prior to leaving:   Not Indicated  Verbalized agreement/Understanding:   Not Indicated  Pt is being d/c'd to home today. Pt's O2 sats are 98% on RA.    Discharge timeout done with Evelyn KIM. All discharge needs and concerns addressed.

## 2024-07-14 NOTE — DISCHARGE INSTRUCTIONS
Check your blood pressures at home twice daily every day after at last 5 minutes at rest with feet on the ground and back supported. Bring your cuff to the doctors office to compare. Call your primary care or cardiologist tomorrow to make an appointment for hospital follow-up.     Please have repeat labs drawn this week to make sure your potassium and magnesium are normal.    Stay hydrated try to avoid large amounts of caffeine and alcohol which may be dehydrating

## 2024-07-14 NOTE — DISCHARGE SUMMARY
pneumothorax. No acute osseous abnormality.     No acute cardiopulmonary process.       The patient was seen and examined on day of discharge and this discharge summary is in conjunction with any daily progress note from day of discharge.Time Spent on discharge is greater than 30 minutes in the examination, evaluation, counseling and review of medications and discharge plan.          Signed:    Erika Joy DO   7/14/2024      Thank you Thea Schuler for the opportunity to be involved in this patient's care. If you have any questions or concerns please feel free to contact me at [facility].

## 2024-07-17 LAB
BACTERIA BLD CULT ORG #2: NORMAL
BACTERIA BLD CULT: NORMAL

## 2024-08-12 RX ORDER — MAGNESIUM CHLORIDE 71.5 G/G
1 TABLET ORAL DAILY
Qty: 30 TABLET | OUTPATIENT
Start: 2024-08-12

## 2024-12-11 ENCOUNTER — APPOINTMENT (OUTPATIENT)
Dept: GENERAL RADIOLOGY | Age: 64
End: 2024-12-11
Payer: COMMERCIAL

## 2024-12-11 ENCOUNTER — APPOINTMENT (OUTPATIENT)
Dept: CT IMAGING | Age: 64
End: 2024-12-11
Payer: COMMERCIAL

## 2024-12-11 ENCOUNTER — HOSPITAL ENCOUNTER (EMERGENCY)
Age: 64
Discharge: HOME | End: 2024-12-11
Attending: STUDENT IN AN ORGANIZED HEALTH CARE EDUCATION/TRAINING PROGRAM
Payer: COMMERCIAL

## 2024-12-11 VITALS
HEART RATE: 86 BPM | BODY MASS INDEX: 28.89 KG/M2 | TEMPERATURE: 98.2 F | OXYGEN SATURATION: 96 % | RESPIRATION RATE: 18 BRPM | SYSTOLIC BLOOD PRESSURE: 110 MMHG | WEIGHT: 225 LBS | DIASTOLIC BLOOD PRESSURE: 83 MMHG

## 2024-12-11 DIAGNOSIS — T07.XXXA MULTIPLE CONTUSIONS: ICD-10-CM
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
## 2024-12-11 DIAGNOSIS — E83.42 HYPOMAGNESEMIA: ICD-10-CM

## 2024-12-11 DIAGNOSIS — E87.6 HYPOKALEMIA: ICD-10-CM

## 2024-12-11 LAB
ALBUMIN SERPL-MCNC: 3.9 G/DL (ref 3.4–5)
ALBUMIN/GLOB SERPL: 1.7 {RATIO} (ref 1.1–2.2)
ALP SERPL-CCNC: 135 U/L (ref 40–129)
ALT SERPL-CCNC: 24 U/L (ref 10–40)
ANION GAP SERPL CALCULATED.3IONS-SCNC: 21 MMOL/L (ref 3–16)
AST SERPL-CCNC: 65 U/L (ref 15–37)
BASE EXCESS BLDV CALC-SCNC: -5.3 MMOL/L (ref -3–3)
BASOPHILS # BLD: 0 K/UL (ref 0–0.2)
BASOPHILS NFR BLD: 0.5 %
BILIRUB SERPL-MCNC: 1.6 MG/DL (ref 0–1)
BUN SERPL-MCNC: 15 MG/DL (ref 7–20)
CALCIUM SERPL-MCNC: 9.1 MG/DL (ref 8.3–10.6)
CHLORIDE SERPL-SCNC: 100 MMOL/L (ref 99–110)
CO2 BLDV-SCNC: 21 MMOL/L
CO2 SERPL-SCNC: 20 MMOL/L (ref 21–32)
COHGB MFR BLDV: 1.3 % (ref 0–1.5)
CREAT SERPL-MCNC: 0.9 MG/DL (ref 0.8–1.3)
DEPRECATED RDW RBC AUTO: 14.5 % (ref 12.4–15.4)
EOSINOPHIL # BLD: 0 K/UL (ref 0–0.6)
EOSINOPHIL NFR BLD: 0.7 %
ETHANOLAMINE SERPL-MCNC: 255 MG/DL (ref 0–0.08)
GFR SERPLBLD CREATININE-BSD FMLA CKD-EPI: >90 ML/MIN/{1.73_M2}
GLUCOSE SERPL-MCNC: 73 MG/DL (ref 70–99)
HCO3 BLDV-SCNC: 20.2 MMOL/L (ref 23–29)
HCT VFR BLD AUTO: 42.1 % (ref 40.5–52.5)
HGB BLD-MCNC: 13.9 G/DL (ref 13.5–17.5)
INR PPP: 0.92 (ref 0.85–1.15)
LYMPHOCYTES # BLD: 1.2 K/UL (ref 1–5.1)
LYMPHOCYTES NFR BLD: 20.8 %
MAGNESIUM SERPL-MCNC: 1.3 MG/DL (ref 1.8–2.4)
MCH RBC QN AUTO: 30.8 PG (ref 26–34)
MCHC RBC AUTO-ENTMCNC: 33.1 G/DL (ref 31–36)
MCV RBC AUTO: 93.1 FL (ref 80–100)
METHGB MFR BLDV: 0 %
MONOCYTES # BLD: 0.5 K/UL (ref 0–1.3)
MONOCYTES NFR BLD: 8.7 %
NEUTROPHILS # BLD: 3.9 K/UL (ref 1.7–7.7)
NEUTROPHILS NFR BLD: 69.3 %
O2 THERAPY: ABNORMAL
PCO2 BLDV: 39.4 MMHG (ref 40–50)
PH BLDV: 7.33 [PH] (ref 7.35–7.45)
PLATELET # BLD AUTO: 118 K/UL (ref 135–450)
PMV BLD AUTO: 8.7 FL (ref 5–10.5)
PO2 BLDV: 43.5 MMHG (ref 25–40)
POTASSIUM SERPL-SCNC: 3.3 MMOL/L (ref 3.5–5.1)
PROT SERPL-MCNC: 6.2 G/DL (ref 6.4–8.2)
PROTHROMBIN TIME: 12.6 SEC (ref 11.9–14.9)
RBC # BLD AUTO: 4.53 M/UL (ref 4.2–5.9)
SAO2 % BLDV: 76 %
SODIUM SERPL-SCNC: 141 MMOL/L (ref 136–145)
TROPONIN, HIGH SENSITIVITY: 16 NG/L (ref 0–22)
TROPONIN, HIGH SENSITIVITY: 16 NG/L (ref 0–22)
WBC # BLD AUTO: 5.6 K/UL (ref 4–11)

## 2024-12-11 PROCEDURE — 72125 CT NECK SPINE W/O DYE: CPT

## 2024-12-11 PROCEDURE — 73030 X-RAY EXAM OF SHOULDER: CPT

## 2024-12-11 PROCEDURE — 70450 CT HEAD/BRAIN W/O DYE: CPT

## 2024-12-11 PROCEDURE — 73562 X-RAY EXAM OF KNEE 3: CPT

## 2024-12-11 PROCEDURE — 82077 ASSAY SPEC XCP UR&BREATH IA: CPT

## 2024-12-11 PROCEDURE — 85025 COMPLETE CBC W/AUTO DIFF WBC: CPT

## 2024-12-11 PROCEDURE — 83735 ASSAY OF MAGNESIUM: CPT

## 2024-12-11 PROCEDURE — 82803 BLOOD GASES ANY COMBINATION: CPT

## 2024-12-11 PROCEDURE — 80053 COMPREHEN METABOLIC PANEL: CPT

## 2024-12-11 PROCEDURE — 6370000000 HC RX 637 (ALT 250 FOR IP): Performed by: STUDENT IN AN ORGANIZED HEALTH CARE EDUCATION/TRAINING PROGRAM

## 2024-12-11 PROCEDURE — 96365 THER/PROPH/DIAG IV INF INIT: CPT

## 2024-12-11 PROCEDURE — 85610 PROTHROMBIN TIME: CPT

## 2024-12-11 PROCEDURE — 6360000002 HC RX W HCPCS: Performed by: STUDENT IN AN ORGANIZED HEALTH CARE EDUCATION/TRAINING PROGRAM

## 2024-12-11 PROCEDURE — 71250 CT THORAX DX C-: CPT

## 2024-12-11 PROCEDURE — 84484 ASSAY OF TROPONIN QUANT: CPT

## 2024-12-11 PROCEDURE — 99284 EMERGENCY DEPT VISIT MOD MDM: CPT

## 2024-12-11 RX ORDER — MAGNESIUM SULFATE IN WATER 40 MG/ML
2000 INJECTION, SOLUTION INTRAVENOUS ONCE
Status: COMPLETED | OUTPATIENT
Start: 2024-12-11 | End: 2024-12-11

## 2024-12-11 RX ORDER — POTASSIUM CHLORIDE 1500 MG/1
40 TABLET, EXTENDED RELEASE ORAL ONCE
Status: COMPLETED | OUTPATIENT
Start: 2024-12-11 | End: 2024-12-11

## 2024-12-11 RX ADMIN — POTASSIUM CHLORIDE 40 MEQ: 1500 TABLET, EXTENDED RELEASE ORAL at 18:10

## 2024-12-11 RX ADMIN — MAGNESIUM SULFATE HEPTAHYDRATE 2000 MG: 40 INJECTION, SOLUTION INTRAVENOUS at 18:12

## 2024-12-11 ASSESSMENT — PAIN - FUNCTIONAL ASSESSMENT: PAIN_FUNCTIONAL_ASSESSMENT: 0-10

## 2024-12-11 ASSESSMENT — PAIN SCALES - GENERAL: PAINLEVEL_OUTOF10: 8

## 2024-12-11 NOTE — ED NOTES
Pt awake, alert and oriented. Reports frequent falls over the last year , most recently last night and today. Has been seen by cardiology and neurology for these symptoms. Sustained significant bruising to right shoulder and upper arm as result of fall.

## 2024-12-12 NOTE — ED PROVIDER NOTES
idiopathic skeletal hyperostosis.         CT LUMBAR SPINE BONY RECONSTRUCTION   Final Result   1. No acute fracture.   2. Severe canal stenosis at L3-4 and L4-5.         CT CHEST ABDOMEN PELVIS WO CONTRAST Additional Contrast? None   Final Result          ED COURSE/MDM  Patient seen and evaluated. Old records reviewed. Labs and imaging reviewed and results discussed with patient.       DIFFERENTIAL DX  Intracranial hemorrhage, spine fracture, pneumothorax, hemothorax, rib fractures, humeral fracture, knee fracture, strain/sprain, contusions, intoxication, syncope    Due to patient's intoxication and multiple falls and diffuse pain, I did obtain a CT pan scan to evaluate for potential traumatic injuries.  CT head and C-spine and chest abdomen and pelvis showed no acute traumatic injuries.  X-ray of the right shoulder and knee are reassuring.  Lab work did reveal hypomagnesemia and hypokalemia which are repleted.  Alcohol level is elevated.  Discussed results with patient and his wife.  Advised that I suspect patient is at least somewhat dehydrated likely leading to much of his dizziness along with the alcohol.  Advised to increase his clear fluid intake.  Given return precautions for worsening dizziness, severe worsening pain, shortness of breath, other concerning symptoms.    Is this patient to be included in the SEP-1 Core Measure due to severe sepsis or septic shock?   No     Exclusion criteria - the patient is NOT to be included for SEP-1 Core Measure due to:  Infection is not suspected    During the patient's ED course, the patient was given:  Medications   potassium chloride (KLOR-CON M) extended release tablet 40 mEq (40 mEq Oral Given 12/11/24 1810)   magnesium sulfate 2000 mg in 50 mL IVPB premix (0 mg IntraVENous Stopped 12/11/24 1908)        ICosme DO,  am the primary clinician of record.    CLINICAL IMPRESSION  1. Fall, initial encounter    2. Multiple contusions    3. Hypomagnesemia    4.

## 2024-12-12 NOTE — DISCHARGE INSTRUCTIONS
Return the nearest ED if you develop severe headaches, nausea vomiting, shortness of breath, other concerning symptoms

## 2025-03-13 RX ORDER — MAGNESIUM CHLORIDE 71.5 G/G
1 TABLET ORAL DAILY
Qty: 30 TABLET | OUTPATIENT
Start: 2025-03-13

## 2025-07-23 ENCOUNTER — APPOINTMENT (OUTPATIENT)
Dept: MRI IMAGING | Age: 65
End: 2025-07-23
Payer: COMMERCIAL

## 2025-07-23 ENCOUNTER — APPOINTMENT (OUTPATIENT)
Dept: CT IMAGING | Age: 65
End: 2025-07-23
Payer: COMMERCIAL

## 2025-07-23 ENCOUNTER — HOSPITAL ENCOUNTER (INPATIENT)
Age: 65
LOS: 10 days | Discharge: NURSING, REHAB OR LONG-TERM CARE | End: 2025-08-02
Attending: STUDENT IN AN ORGANIZED HEALTH CARE EDUCATION/TRAINING PROGRAM | Admitting: INTERNAL MEDICINE
Payer: COMMERCIAL

## 2025-07-23 DIAGNOSIS — E87.29 ALCOHOLIC KETOACIDOSIS: ICD-10-CM

## 2025-07-23 DIAGNOSIS — F10.930 ALCOHOL WITHDRAWAL SYNDROME WITHOUT COMPLICATION (HCC): ICD-10-CM

## 2025-07-23 DIAGNOSIS — E51.2 WERNICKE'S ENCEPHALOPATHY: Primary | ICD-10-CM

## 2025-07-23 DIAGNOSIS — E83.42 HYPOMAGNESEMIA: ICD-10-CM

## 2025-07-23 PROBLEM — I10 HYPERTENSION: Status: ACTIVE | Noted: 2025-07-23

## 2025-07-23 LAB
ALBUMIN SERPL-MCNC: 3.7 G/DL (ref 3.4–5)
ALBUMIN/GLOB SERPL: 1.2 {RATIO} (ref 1.1–2.2)
ALP SERPL-CCNC: 165 U/L (ref 40–129)
ALT SERPL-CCNC: 63 U/L (ref 10–40)
ANION GAP SERPL CALCULATED.3IONS-SCNC: 20 MMOL/L (ref 3–16)
AST SERPL-CCNC: 77 U/L (ref 15–37)
BASE EXCESS BLDV CALC-SCNC: 1.3 MMOL/L (ref -3–3)
BASOPHILS # BLD: 0 K/UL (ref 0–0.2)
BASOPHILS NFR BLD: 1.1 %
BILIRUB SERPL-MCNC: 1.5 MG/DL (ref 0–1)
BUN SERPL-MCNC: 7 MG/DL (ref 7–20)
CALCIUM SERPL-MCNC: 8.7 MG/DL (ref 8.3–10.6)
CHLORIDE SERPL-SCNC: 97 MMOL/L (ref 99–110)
CO2 BLDV-SCNC: 26 MMOL/L
CO2 SERPL-SCNC: 19 MMOL/L (ref 21–32)
COHGB MFR BLDV: 1.4 % (ref 0–1.5)
CREAT SERPL-MCNC: 0.8 MG/DL (ref 0.8–1.3)
DEPRECATED RDW RBC AUTO: 14.5 % (ref 12.4–15.4)
EKG ATRIAL RATE: 88 BPM
EKG DIAGNOSIS: NORMAL
EKG P AXIS: 28 DEGREES
EKG P-R INTERVAL: 144 MS
EKG Q-T INTERVAL: 390 MS
EKG QRS DURATION: 84 MS
EKG QTC CALCULATION (BAZETT): 471 MS
EKG R AXIS: -27 DEGREES
EKG T AXIS: 5 DEGREES
EKG VENTRICULAR RATE: 88 BPM
EOSINOPHIL # BLD: 0.1 K/UL (ref 0–0.6)
EOSINOPHIL NFR BLD: 3.7 %
ETHANOLAMINE SERPL-MCNC: NORMAL MG/DL (ref 0–0.08)
GFR SERPLBLD CREATININE-BSD FMLA CKD-EPI: >90 ML/MIN/{1.73_M2}
GLUCOSE SERPL-MCNC: 170 MG/DL (ref 70–99)
HCO3 BLDV-SCNC: 24.7 MMOL/L (ref 23–29)
HCT VFR BLD AUTO: 40.4 % (ref 40.5–52.5)
HGB BLD-MCNC: 13.3 G/DL (ref 13.5–17.5)
LIPASE SERPL-CCNC: 10 U/L (ref 13–60)
LYMPHOCYTES # BLD: 0.5 K/UL (ref 1–5.1)
LYMPHOCYTES NFR BLD: 18.4 %
MAGNESIUM SERPL-MCNC: 1.04 MG/DL (ref 1.8–2.4)
MCH RBC QN AUTO: 30.6 PG (ref 26–34)
MCHC RBC AUTO-ENTMCNC: 33 G/DL (ref 31–36)
MCV RBC AUTO: 92.6 FL (ref 80–100)
METHGB MFR BLDV: 0.1 %
MONOCYTES # BLD: 0.3 K/UL (ref 0–1.3)
MONOCYTES NFR BLD: 11.3 %
NEUTROPHILS # BLD: 1.7 K/UL (ref 1.7–7.7)
NEUTROPHILS NFR BLD: 65.5 %
O2 CT VFR BLDV CALC: 17 VOL %
O2 THERAPY: ABNORMAL
PCO2 BLDV: 35.5 MMHG (ref 40–50)
PH BLDV: 7.46 [PH] (ref 7.35–7.45)
PLATELET # BLD AUTO: 103 K/UL (ref 135–450)
PMV BLD AUTO: 10.3 FL (ref 5–10.5)
PO2 BLDV: 59.3 MMHG (ref 25–40)
POTASSIUM SERPL-SCNC: 3 MMOL/L (ref 3.5–5.1)
PROT SERPL-MCNC: 6.7 G/DL (ref 6.4–8.2)
RBC # BLD AUTO: 4.36 M/UL (ref 4.2–5.9)
SAO2 % BLDV: 92 %
SODIUM SERPL-SCNC: 136 MMOL/L (ref 136–145)
WBC # BLD AUTO: 2.5 K/UL (ref 4–11)

## 2025-07-23 PROCEDURE — 99285 EMERGENCY DEPT VISIT HI MDM: CPT

## 2025-07-23 PROCEDURE — 70450 CT HEAD/BRAIN W/O DYE: CPT

## 2025-07-23 PROCEDURE — 2060000000 HC ICU INTERMEDIATE R&B

## 2025-07-23 PROCEDURE — 72125 CT NECK SPINE W/O DYE: CPT

## 2025-07-23 PROCEDURE — 6360000002 HC RX W HCPCS: Performed by: STUDENT IN AN ORGANIZED HEALTH CARE EDUCATION/TRAINING PROGRAM

## 2025-07-23 PROCEDURE — 2500000003 HC RX 250 WO HCPCS

## 2025-07-23 PROCEDURE — 6360000002 HC RX W HCPCS

## 2025-07-23 PROCEDURE — 82077 ASSAY SPEC XCP UR&BREATH IA: CPT

## 2025-07-23 PROCEDURE — 85025 COMPLETE CBC W/AUTO DIFF WBC: CPT

## 2025-07-23 PROCEDURE — 6370000000 HC RX 637 (ALT 250 FOR IP): Performed by: STUDENT IN AN ORGANIZED HEALTH CARE EDUCATION/TRAINING PROGRAM

## 2025-07-23 PROCEDURE — 96374 THER/PROPH/DIAG INJ IV PUSH: CPT

## 2025-07-23 PROCEDURE — 6370000000 HC RX 637 (ALT 250 FOR IP)

## 2025-07-23 PROCEDURE — 2580000003 HC RX 258: Performed by: STUDENT IN AN ORGANIZED HEALTH CARE EDUCATION/TRAINING PROGRAM

## 2025-07-23 PROCEDURE — 93005 ELECTROCARDIOGRAM TRACING: CPT | Performed by: STUDENT IN AN ORGANIZED HEALTH CARE EDUCATION/TRAINING PROGRAM

## 2025-07-23 PROCEDURE — 70551 MRI BRAIN STEM W/O DYE: CPT

## 2025-07-23 PROCEDURE — 2580000003 HC RX 258

## 2025-07-23 PROCEDURE — 96375 TX/PRO/DX INJ NEW DRUG ADDON: CPT

## 2025-07-23 PROCEDURE — 82803 BLOOD GASES ANY COMBINATION: CPT

## 2025-07-23 PROCEDURE — 99223 1ST HOSP IP/OBS HIGH 75: CPT

## 2025-07-23 PROCEDURE — 93010 ELECTROCARDIOGRAM REPORT: CPT | Performed by: STUDENT IN AN ORGANIZED HEALTH CARE EDUCATION/TRAINING PROGRAM

## 2025-07-23 PROCEDURE — 83690 ASSAY OF LIPASE: CPT

## 2025-07-23 PROCEDURE — 80053 COMPREHEN METABOLIC PANEL: CPT

## 2025-07-23 PROCEDURE — 83735 ASSAY OF MAGNESIUM: CPT

## 2025-07-23 RX ORDER — POTASSIUM CHLORIDE 1500 MG/1
40 TABLET, EXTENDED RELEASE ORAL PRN
Status: DISCONTINUED | OUTPATIENT
Start: 2025-07-23 | End: 2025-08-02

## 2025-07-23 RX ORDER — LOSARTAN POTASSIUM 100 MG/1
100 TABLET ORAL DAILY
Status: DISCONTINUED | OUTPATIENT
Start: 2025-07-23 | End: 2025-08-02

## 2025-07-23 RX ORDER — POTASSIUM CHLORIDE 7.45 MG/ML
10 INJECTION INTRAVENOUS PRN
Status: DISCONTINUED | OUTPATIENT
Start: 2025-07-23 | End: 2025-08-02

## 2025-07-23 RX ORDER — BUPROPION HYDROCHLORIDE 150 MG/1
150 TABLET ORAL DAILY
Status: DISCONTINUED | OUTPATIENT
Start: 2025-07-23 | End: 2025-08-02 | Stop reason: HOSPADM

## 2025-07-23 RX ORDER — SODIUM CHLORIDE 0.9 % (FLUSH) 0.9 %
5-40 SYRINGE (ML) INJECTION PRN
Status: DISCONTINUED | OUTPATIENT
Start: 2025-07-23 | End: 2025-08-02 | Stop reason: HOSPADM

## 2025-07-23 RX ORDER — FOLIC ACID 1 MG/1
1 TABLET ORAL DAILY
Status: DISCONTINUED | OUTPATIENT
Start: 2025-07-23 | End: 2025-08-02 | Stop reason: HOSPADM

## 2025-07-23 RX ORDER — MAGNESIUM SULFATE IN WATER 40 MG/ML
4000 INJECTION, SOLUTION INTRAVENOUS ONCE
Status: COMPLETED | OUTPATIENT
Start: 2025-07-23 | End: 2025-07-23

## 2025-07-23 RX ORDER — SODIUM CHLORIDE 0.9 % (FLUSH) 0.9 %
5-40 SYRINGE (ML) INJECTION EVERY 12 HOURS SCHEDULED
Status: DISCONTINUED | OUTPATIENT
Start: 2025-07-23 | End: 2025-08-02 | Stop reason: HOSPADM

## 2025-07-23 RX ORDER — THIAMINE HYDROCHLORIDE 100 MG/ML
250 INJECTION, SOLUTION INTRAMUSCULAR; INTRAVENOUS EVERY 24 HOURS
Status: DISCONTINUED | OUTPATIENT
Start: 2025-07-25 | End: 2025-07-24

## 2025-07-23 RX ORDER — ENOXAPARIN SODIUM 100 MG/ML
30 INJECTION SUBCUTANEOUS 2 TIMES DAILY
Status: DISCONTINUED | OUTPATIENT
Start: 2025-07-23 | End: 2025-08-02 | Stop reason: HOSPADM

## 2025-07-23 RX ORDER — DIAZEPAM 10 MG/2ML
5 INJECTION, SOLUTION INTRAMUSCULAR; INTRAVENOUS EVERY 4 HOURS PRN
Status: DISCONTINUED | OUTPATIENT
Start: 2025-07-23 | End: 2025-07-24

## 2025-07-23 RX ORDER — ONDANSETRON 2 MG/ML
4 INJECTION INTRAMUSCULAR; INTRAVENOUS EVERY 6 HOURS PRN
Status: DISCONTINUED | OUTPATIENT
Start: 2025-07-23 | End: 2025-08-02 | Stop reason: HOSPADM

## 2025-07-23 RX ORDER — SODIUM CHLORIDE 9 MG/ML
INJECTION, SOLUTION INTRAVENOUS PRN
Status: DISCONTINUED | OUTPATIENT
Start: 2025-07-23 | End: 2025-08-02 | Stop reason: HOSPADM

## 2025-07-23 RX ORDER — ACETAMINOPHEN 325 MG/1
650 TABLET ORAL EVERY 6 HOURS PRN
Status: DISCONTINUED | OUTPATIENT
Start: 2025-07-23 | End: 2025-08-02 | Stop reason: HOSPADM

## 2025-07-23 RX ORDER — AMLODIPINE BESYLATE 5 MG/1
5 TABLET ORAL DAILY
Status: DISCONTINUED | OUTPATIENT
Start: 2025-07-23 | End: 2025-08-02 | Stop reason: HOSPADM

## 2025-07-23 RX ORDER — LORAZEPAM 2 MG/1
2 TABLET ORAL
Status: DISCONTINUED | OUTPATIENT
Start: 2025-07-23 | End: 2025-07-24

## 2025-07-23 RX ORDER — POTASSIUM CHLORIDE 1500 MG/1
40 TABLET, EXTENDED RELEASE ORAL ONCE
Status: COMPLETED | OUTPATIENT
Start: 2025-07-23 | End: 2025-07-23

## 2025-07-23 RX ORDER — SODIUM CHLORIDE 9 MG/ML
INJECTION, SOLUTION INTRAVENOUS PRN
Status: DISCONTINUED | OUTPATIENT
Start: 2025-07-23 | End: 2025-07-24

## 2025-07-23 RX ORDER — CHLORDIAZEPOXIDE HYDROCHLORIDE 25 MG/1
25 CAPSULE, GELATIN COATED ORAL 3 TIMES DAILY
Status: DISCONTINUED | OUTPATIENT
Start: 2025-07-23 | End: 2025-07-25

## 2025-07-23 RX ORDER — ONDANSETRON 4 MG/1
4 TABLET, ORALLY DISINTEGRATING ORAL EVERY 8 HOURS PRN
Status: DISCONTINUED | OUTPATIENT
Start: 2025-07-23 | End: 2025-08-02 | Stop reason: HOSPADM

## 2025-07-23 RX ORDER — LANOLIN ALCOHOL/MO/W.PET/CERES
100 CREAM (GRAM) TOPICAL DAILY
Status: DISCONTINUED | OUTPATIENT
Start: 2025-07-30 | End: 2025-07-24

## 2025-07-23 RX ORDER — POLYETHYLENE GLYCOL 3350 17 G/17G
17 POWDER, FOR SOLUTION ORAL DAILY PRN
Status: DISCONTINUED | OUTPATIENT
Start: 2025-07-23 | End: 2025-08-02 | Stop reason: HOSPADM

## 2025-07-23 RX ORDER — ASPIRIN 81 MG/1
81 TABLET ORAL DAILY
Status: DISCONTINUED | OUTPATIENT
Start: 2025-07-23 | End: 2025-08-02 | Stop reason: HOSPADM

## 2025-07-23 RX ORDER — TAMSULOSIN HYDROCHLORIDE 0.4 MG/1
0.4 CAPSULE ORAL NIGHTLY
Status: DISCONTINUED | OUTPATIENT
Start: 2025-07-23 | End: 2025-08-02 | Stop reason: HOSPADM

## 2025-07-23 RX ORDER — CARVEDILOL 6.25 MG/1
6.25 TABLET ORAL 2 TIMES DAILY WITH MEALS
COMMUNITY
Start: 2025-05-19

## 2025-07-23 RX ORDER — MULTIVITAMIN WITH IRON
1 TABLET ORAL DAILY
Status: DISCONTINUED | OUTPATIENT
Start: 2025-07-23 | End: 2025-08-02 | Stop reason: HOSPADM

## 2025-07-23 RX ORDER — THIAMINE HYDROCHLORIDE 100 MG/ML
100 INJECTION, SOLUTION INTRAMUSCULAR; INTRAVENOUS ONCE
Status: COMPLETED | OUTPATIENT
Start: 2025-07-23 | End: 2025-07-23

## 2025-07-23 RX ORDER — AMLODIPINE BESYLATE 5 MG/1
2.5 TABLET ORAL DAILY
COMMUNITY

## 2025-07-23 RX ORDER — SODIUM CHLORIDE 9 MG/ML
INJECTION, SOLUTION INTRAVENOUS CONTINUOUS
Status: DISCONTINUED | OUTPATIENT
Start: 2025-07-23 | End: 2025-07-24

## 2025-07-23 RX ORDER — TAMSULOSIN HYDROCHLORIDE 0.4 MG/1
0.4 CAPSULE ORAL NIGHTLY
COMMUNITY
Start: 2025-07-21

## 2025-07-23 RX ORDER — ACETAMINOPHEN 650 MG/1
650 SUPPOSITORY RECTAL EVERY 6 HOURS PRN
Status: DISCONTINUED | OUTPATIENT
Start: 2025-07-23 | End: 2025-08-02 | Stop reason: HOSPADM

## 2025-07-23 RX ORDER — CARVEDILOL 6.25 MG/1
6.25 TABLET ORAL 2 TIMES DAILY WITH MEALS
Status: DISCONTINUED | OUTPATIENT
Start: 2025-07-23 | End: 2025-08-02 | Stop reason: HOSPADM

## 2025-07-23 RX ORDER — SODIUM CHLORIDE, SODIUM LACTATE, POTASSIUM CHLORIDE, AND CALCIUM CHLORIDE .6; .31; .03; .02 G/100ML; G/100ML; G/100ML; G/100ML
1000 INJECTION, SOLUTION INTRAVENOUS ONCE
Status: COMPLETED | OUTPATIENT
Start: 2025-07-23 | End: 2025-07-23

## 2025-07-23 RX ORDER — LORAZEPAM 1 MG/1
1 TABLET ORAL
Status: DISCONTINUED | OUTPATIENT
Start: 2025-07-23 | End: 2025-07-24

## 2025-07-23 RX ORDER — BUPROPION HYDROCHLORIDE 150 MG/1
150 TABLET ORAL DAILY
COMMUNITY

## 2025-07-23 RX ORDER — LORAZEPAM 2 MG/1
4 TABLET ORAL
Status: DISCONTINUED | OUTPATIENT
Start: 2025-07-23 | End: 2025-07-24

## 2025-07-23 RX ORDER — THIAMINE HYDROCHLORIDE 100 MG/ML
500 INJECTION, SOLUTION INTRAMUSCULAR; INTRAVENOUS EVERY 8 HOURS
Status: DISCONTINUED | OUTPATIENT
Start: 2025-07-23 | End: 2025-07-24

## 2025-07-23 RX ADMIN — THIAMINE HYDROCHLORIDE 400 MG: 100 INJECTION, SOLUTION INTRAMUSCULAR; INTRAVENOUS at 18:09

## 2025-07-23 RX ADMIN — POTASSIUM CHLORIDE 40 MEQ: 1500 TABLET, EXTENDED RELEASE ORAL at 15:10

## 2025-07-23 RX ADMIN — ENOXAPARIN SODIUM 30 MG: 100 INJECTION SUBCUTANEOUS at 20:36

## 2025-07-23 RX ADMIN — SODIUM CHLORIDE: 0.9 INJECTION, SOLUTION INTRAVENOUS at 18:53

## 2025-07-23 RX ADMIN — CHLORDIAZEPOXIDE HYDROCHLORIDE 25 MG: 25 CAPSULE ORAL at 20:36

## 2025-07-23 RX ADMIN — CARVEDILOL 6.25 MG: 6.25 TABLET, FILM COATED ORAL at 18:07

## 2025-07-23 RX ADMIN — THIAMINE HYDROCHLORIDE 100 MG: 100 INJECTION, SOLUTION INTRAMUSCULAR; INTRAVENOUS at 15:12

## 2025-07-23 RX ADMIN — MAGNESIUM SULFATE HEPTAHYDRATE 4000 MG: 40 INJECTION, SOLUTION INTRAVENOUS at 15:19

## 2025-07-23 RX ADMIN — CHLORDIAZEPOXIDE HYDROCHLORIDE 25 MG: 25 CAPSULE ORAL at 18:07

## 2025-07-23 RX ADMIN — THERA TABS 1 TABLET: TAB at 18:07

## 2025-07-23 RX ADMIN — TAMSULOSIN HYDROCHLORIDE 0.4 MG: 0.4 CAPSULE ORAL at 20:36

## 2025-07-23 RX ADMIN — SODIUM CHLORIDE, SODIUM LACTATE, POTASSIUM CHLORIDE, AND CALCIUM CHLORIDE 1000 ML: .6; .31; .03; .02 INJECTION, SOLUTION INTRAVENOUS at 14:32

## 2025-07-23 RX ADMIN — SODIUM CHLORIDE, PRESERVATIVE FREE 10 ML: 5 INJECTION INTRAVENOUS at 20:38

## 2025-07-23 RX ADMIN — THIAMINE HYDROCHLORIDE 500 MG: 100 INJECTION, SOLUTION INTRAMUSCULAR; INTRAVENOUS at 23:47

## 2025-07-23 RX ADMIN — SODIUM CHLORIDE, PRESERVATIVE FREE 10 ML: 5 INJECTION INTRAVENOUS at 20:43

## 2025-07-23 RX ADMIN — AMITRIPTYLINE HYDROCHLORIDE 25 MG: 25 TABLET, COATED ORAL at 20:36

## 2025-07-23 RX ADMIN — FOLIC ACID 1 MG: 1 TABLET ORAL at 18:07

## 2025-07-23 ASSESSMENT — LIFESTYLE VARIABLES
HOW MANY STANDARD DRINKS CONTAINING ALCOHOL DO YOU HAVE ON A TYPICAL DAY: 5 OR 6
HOW OFTEN DO YOU HAVE A DRINK CONTAINING ALCOHOL: 4 OR MORE TIMES A WEEK

## 2025-07-23 ASSESSMENT — PAIN - FUNCTIONAL ASSESSMENT: PAIN_FUNCTIONAL_ASSESSMENT: NONE - DENIES PAIN

## 2025-07-23 NOTE — ED NOTES
Franklin Mahmood is a 64 y.o. male admitted for  Principal Problem:    Alcohol withdrawal syndrome without complication (HCC)  Resolved Problems:    * No resolved hospital problems. *  .   Patient Home via EMS transportation with   Chief Complaint   Patient presents with    Alcohol Intoxication     Pt arrives via EMS with alcohol withdrawal, falls, and having delusions. EMS reports pt had 2 falls for them (no LOC -Blood thinners). Pt states he has been going to PT for his gait. Pt drinks a bottle of whiskey every 2-3 days. Last drink @ 1700 yesterday    .  Patient is alert and Person and Place. Pt is hallucinating.   Patient's baseline mobility: Baseline Mobility: Independent normally but not at this time.   Code Status: Prior   Cardiac Rhythm:       Is patient on baseline Oxygen: no   Abnormal Assessment Findings: hallucinations.  Pt not able to hold a cup or take meds independently.  Sees 3 clocks on the wall when there is only 1.      Isolation: None      NIH Score:    C-SSRS: Risk of Suicide: No Risk  Bedside swallow:        Active LDA's:   Peripheral IV 07/23/25 Right Antecubital (Active)     Patient admitted with a garcia: no     Family/Caregiver Present yes Any Concerns: no   Restraints no  Sitter no    Vitals: MEWS Score: 1    Vitals:    07/23/25 1332 07/23/25 1335 07/23/25 1400   BP: (!) 142/97 (!) 142/97 (!) 126/93   Pulse: 100 99 99   Resp: 16 16 15   Temp: 98.6 °F (37 °C)     TempSrc: Oral     SpO2: 96% 98% 96%   Weight: 102.1 kg (225 lb)     Height: 1.88 m (6' 2\")         Last documented pain score (0-10 scale)    Pain medication administered No.    Pertinent or High Risk Medications/Drips: No.    Pending Blood Product Administration: no    Abnormal labs:   Abnormal Labs Reviewed   CBC WITH AUTO DIFFERENTIAL - Abnormal; Notable for the following components:       Result Value    WBC 2.5 (*)     Hemoglobin 13.3 (*)     Hematocrit 40.4 (*)     Platelets 103 (*)     Lymphocytes Absolute 0.5 (*)     All other

## 2025-07-23 NOTE — FLOWSHEET NOTE
07/23/25 1744   Vital Signs   Temp 97.3 °F (36.3 °C)   Temp Source Oral   Pulse 95   Heart Rate Source Monitor   Respirations 19   BP (!) 167/100   MAP (Calculated) 122   BP Location Left upper arm   BP Method Automatic   Patient Position Sitting   Oxygen Therapy   SpO2 97 %   O2 Device None (Room air)     Patient admitted to room 307 from ER. Patient oriented to room, call light, bed rails, phone, lights and bathroom. Patient instructed about the schedule of the day including: vital sign frequency, lab draws, possible tests, frequency of MD and staff rounds, daily weights, I &O's and prescribed diet. Telemetry box in place, patient aware of placement and reason. Bed locked, in lowest position, side rails up 2/4, call light within reach.        Recliner Assessment  Patient is not able to demonstrate the ability to move from a reclining position to an upright position within the recliner due to unsteady gait/history of falls.       4 Eyes Skin Assessment     NAME:  Franklin Mahmood  YOB: 1960  MEDICAL RECORD NUMBER:  [id number]    The patient is being assessed for  Admission    I agree that at least one RN has performed a thorough Head to Toe Skin Assessment on the patient. ALL assessment sites listed below have been assessed.      Areas assessed by both nurses:    Head, Face, Ears, Shoulders, Back, Chest, Arms, Elbows, Hands, Sacrum. Buttock, Coccyx, Ischium, and Legs. Feet and Heels        Does the Patient have a Wound? No noted wound(s)       Santos Prevention initiated by RN: No  Wound Care Orders initiated by RN: No    Pressure Injury (Stage 3,4, Unstageable, DTI, NWPT, and Complex wounds) if present, place Wound referral order by RN under : No    New Ostomies, if present place, Ostomy referral order under : No     Nurse 1 eSignature: Electronically signed by Tina Garrett RN on 7/23/25 at 6:35 PM EDT    **SHARE this note so that the co-signing nurse can place an  eSignature**    Nurse 2 eSignature: Electronically signed by Evelyn Rollins RN on 7/23/25 at 7:01 PM EDT

## 2025-07-23 NOTE — H&P
Hospital Medicine History & Physical      PCP: Thea Schuler    Date of Admission: 7/23/2025    Date of Service: Pt seen/examined on 07/23/25    Chief Complaint:    Chief Complaint   Patient presents with    Alcohol Intoxication     Pt arrives via EMS with alcohol withdrawal, falls, and having delusions. EMS reports pt had 2 falls for them (no LOC -Blood thinners). Pt states he has been going to PT for his gait. Pt drinks a bottle of whiskey every 2-3 days. Last drink @ 1700 yesterday          History Of Present Illness:      The patient is a 64 y.o. male with a PMHx of alcohol abuse, hypertension, mood disorder, BPH who presents to [facility] with gait instability and falling at home.  His wife is in the room helping to provide part of the history. This afternoon, he stood up and became extremely lightheaded and collapsed to the ground. It happened again a couple moments later. He denies losing consciousness or hitting his head when lowering himself to the ground. He does have a history of alcohol abuse and withdrawal. He typically drinks about 6 shots of bourbon per day. His last drink was yesterday evening around 4 pm when he had a maxine. His wife also states that he believed people were in their house when in actuality no one was there. She also states that he believed people were playing ping pong in the ED prior to my exam. He denies a history of seizure from withdrawal. He has a history of gait instability for which he has been going to balance therapy for the past couple of months. He states that this has been an ongoing issue for years. His prior pcp believed the lightheadedness and falling could be due to orthostatic hypotension.  He denies chest pain, loss of consciousness, nausea, vomiting, and diarrhea.   History obtained from the patient and review of EMR.     Past Medical History:        Diagnosis Date    Acid reflux     Alcohol abuse     Hyperlipidemia     Hypertension     Vertigo

## 2025-07-23 NOTE — PROGRESS NOTES
Bedside report and transfer of care given to JUDY Murillo. Pt currently resting in bed with the call light within reach. Pt denies any other care needs at this time. Pt stable at this time.      Tina Garrett RN

## 2025-07-24 PROBLEM — E87.6 HYPOKALEMIA: Status: ACTIVE | Noted: 2025-07-24

## 2025-07-24 LAB
ALBUMIN SERPL-MCNC: 3.5 G/DL (ref 3.4–5)
ALBUMIN/GLOB SERPL: 1.8 {RATIO} (ref 1.1–2.2)
ALP SERPL-CCNC: 136 U/L (ref 40–129)
ALT SERPL-CCNC: 50 U/L (ref 10–40)
ANION GAP SERPL CALCULATED.3IONS-SCNC: 14 MMOL/L (ref 3–16)
AST SERPL-CCNC: 60 U/L (ref 15–37)
BASOPHILS # BLD: 0 K/UL (ref 0–0.2)
BASOPHILS NFR BLD: 0.9 %
BILIRUB SERPL-MCNC: 1.3 MG/DL (ref 0–1)
BUN SERPL-MCNC: 4 MG/DL (ref 7–20)
CALCIUM SERPL-MCNC: 9.1 MG/DL (ref 8.3–10.6)
CHLORIDE SERPL-SCNC: 105 MMOL/L (ref 99–110)
CO2 SERPL-SCNC: 23 MMOL/L (ref 21–32)
CREAT SERPL-MCNC: 0.7 MG/DL (ref 0.8–1.3)
DEPRECATED RDW RBC AUTO: 14.4 % (ref 12.4–15.4)
EOSINOPHIL # BLD: 0.1 K/UL (ref 0–0.6)
EOSINOPHIL NFR BLD: 2.6 %
EST. AVERAGE GLUCOSE BLD GHB EST-MCNC: 99.7 MG/DL
GFR SERPLBLD CREATININE-BSD FMLA CKD-EPI: >90 ML/MIN/{1.73_M2}
GLUCOSE SERPL-MCNC: 120 MG/DL (ref 70–99)
HBA1C MFR BLD: 5.1 %
HCT VFR BLD AUTO: 39.1 % (ref 40.5–52.5)
HGB BLD-MCNC: 12.7 G/DL (ref 13.5–17.5)
LYMPHOCYTES # BLD: 0.7 K/UL (ref 1–5.1)
LYMPHOCYTES NFR BLD: 20.7 %
MAGNESIUM SERPL-MCNC: 1.41 MG/DL (ref 1.8–2.4)
MAGNESIUM SERPL-MCNC: 1.7 MG/DL (ref 1.8–2.4)
MCH RBC QN AUTO: 30.3 PG (ref 26–34)
MCHC RBC AUTO-ENTMCNC: 32.5 G/DL (ref 31–36)
MCV RBC AUTO: 93.1 FL (ref 80–100)
MONOCYTES # BLD: 0.5 K/UL (ref 0–1.3)
MONOCYTES NFR BLD: 15.6 %
NEUTROPHILS # BLD: 2.1 K/UL (ref 1.7–7.7)
NEUTROPHILS NFR BLD: 60.2 %
PLATELET # BLD AUTO: 102 K/UL (ref 135–450)
PMV BLD AUTO: 10.6 FL (ref 5–10.5)
POTASSIUM SERPL-SCNC: 2.8 MMOL/L (ref 3.5–5.1)
POTASSIUM SERPL-SCNC: 2.9 MMOL/L (ref 3.5–5.1)
POTASSIUM SERPL-SCNC: 3.1 MMOL/L (ref 3.5–5.1)
PROT SERPL-MCNC: 5.4 G/DL (ref 6.4–8.2)
RBC # BLD AUTO: 4.2 M/UL (ref 4.2–5.9)
SODIUM SERPL-SCNC: 142 MMOL/L (ref 136–145)
WBC # BLD AUTO: 3.5 K/UL (ref 4–11)

## 2025-07-24 PROCEDURE — 36415 COLL VENOUS BLD VENIPUNCTURE: CPT

## 2025-07-24 PROCEDURE — 2060000000 HC ICU INTERMEDIATE R&B

## 2025-07-24 PROCEDURE — 85025 COMPLETE CBC W/AUTO DIFF WBC: CPT

## 2025-07-24 PROCEDURE — APPSS45 APP SPLIT SHARED TIME 31-45 MINUTES

## 2025-07-24 PROCEDURE — 6360000002 HC RX W HCPCS

## 2025-07-24 PROCEDURE — 6370000000 HC RX 637 (ALT 250 FOR IP): Performed by: STUDENT IN AN ORGANIZED HEALTH CARE EDUCATION/TRAINING PROGRAM

## 2025-07-24 PROCEDURE — 6360000002 HC RX W HCPCS: Performed by: INTERNAL MEDICINE

## 2025-07-24 PROCEDURE — 83735 ASSAY OF MAGNESIUM: CPT

## 2025-07-24 PROCEDURE — 2500000003 HC RX 250 WO HCPCS

## 2025-07-24 PROCEDURE — 2580000003 HC RX 258: Performed by: INTERNAL MEDICINE

## 2025-07-24 PROCEDURE — 84132 ASSAY OF SERUM POTASSIUM: CPT

## 2025-07-24 PROCEDURE — 99233 SBSQ HOSP IP/OBS HIGH 50: CPT | Performed by: INTERNAL MEDICINE

## 2025-07-24 PROCEDURE — 2580000003 HC RX 258

## 2025-07-24 PROCEDURE — 83036 HEMOGLOBIN GLYCOSYLATED A1C: CPT

## 2025-07-24 PROCEDURE — 6360000002 HC RX W HCPCS: Performed by: STUDENT IN AN ORGANIZED HEALTH CARE EDUCATION/TRAINING PROGRAM

## 2025-07-24 PROCEDURE — 80053 COMPREHEN METABOLIC PANEL: CPT

## 2025-07-24 PROCEDURE — 6370000000 HC RX 637 (ALT 250 FOR IP)

## 2025-07-24 RX ORDER — MAGNESIUM SULFATE IN WATER 40 MG/ML
2000 INJECTION, SOLUTION INTRAVENOUS PRN
Status: DISCONTINUED | OUTPATIENT
Start: 2025-07-24 | End: 2025-08-02 | Stop reason: HOSPADM

## 2025-07-24 RX ORDER — DIAZEPAM 10 MG/2ML
20 INJECTION, SOLUTION INTRAMUSCULAR; INTRAVENOUS
Status: DISCONTINUED | OUTPATIENT
Start: 2025-07-24 | End: 2025-07-28

## 2025-07-24 RX ORDER — DIAZEPAM 5 MG/1
5 TABLET ORAL
Status: DISCONTINUED | OUTPATIENT
Start: 2025-07-24 | End: 2025-07-28

## 2025-07-24 RX ORDER — THIAMINE HYDROCHLORIDE 100 MG/ML
500 INJECTION, SOLUTION INTRAMUSCULAR; INTRAVENOUS 3 TIMES DAILY
Status: DISPENSED | OUTPATIENT
Start: 2025-07-24 | End: 2025-07-26

## 2025-07-24 RX ORDER — DIAZEPAM 10 MG/1
10 TABLET ORAL
Status: DISCONTINUED | OUTPATIENT
Start: 2025-07-24 | End: 2025-07-28

## 2025-07-24 RX ORDER — LANOLIN ALCOHOL/MO/W.PET/CERES
100 CREAM (GRAM) TOPICAL DAILY
Status: DISCONTINUED | OUTPATIENT
Start: 2025-07-26 | End: 2025-08-02 | Stop reason: HOSPADM

## 2025-07-24 RX ORDER — DIAZEPAM 10 MG/1
20 TABLET ORAL
Status: DISCONTINUED | OUTPATIENT
Start: 2025-07-24 | End: 2025-07-28

## 2025-07-24 RX ORDER — SODIUM CHLORIDE, SODIUM LACTATE, POTASSIUM CHLORIDE, CALCIUM CHLORIDE 600; 310; 30; 20 MG/100ML; MG/100ML; MG/100ML; MG/100ML
INJECTION, SOLUTION INTRAVENOUS CONTINUOUS
Status: DISCONTINUED | OUTPATIENT
Start: 2025-07-24 | End: 2025-07-28

## 2025-07-24 RX ORDER — DIAZEPAM 10 MG/2ML
5 INJECTION, SOLUTION INTRAMUSCULAR; INTRAVENOUS
Status: DISCONTINUED | OUTPATIENT
Start: 2025-07-24 | End: 2025-07-28

## 2025-07-24 RX ORDER — HALOPERIDOL 5 MG/ML
5 INJECTION INTRAMUSCULAR EVERY 8 HOURS PRN
Status: DISCONTINUED | OUTPATIENT
Start: 2025-07-24 | End: 2025-07-28

## 2025-07-24 RX ORDER — DIAZEPAM 10 MG/2ML
10 INJECTION, SOLUTION INTRAMUSCULAR; INTRAVENOUS
Status: DISCONTINUED | OUTPATIENT
Start: 2025-07-24 | End: 2025-07-28

## 2025-07-24 RX ORDER — DIAZEPAM 10 MG/2ML
15 INJECTION, SOLUTION INTRAMUSCULAR; INTRAVENOUS
Status: DISCONTINUED | OUTPATIENT
Start: 2025-07-24 | End: 2025-07-28

## 2025-07-24 RX ADMIN — CHLORDIAZEPOXIDE HYDROCHLORIDE 25 MG: 25 CAPSULE ORAL at 08:14

## 2025-07-24 RX ADMIN — CHLORDIAZEPOXIDE HYDROCHLORIDE 25 MG: 25 CAPSULE ORAL at 20:20

## 2025-07-24 RX ADMIN — SODIUM CHLORIDE, SODIUM LACTATE, POTASSIUM CHLORIDE, AND CALCIUM CHLORIDE: .6; .31; .03; .02 INJECTION, SOLUTION INTRAVENOUS at 13:38

## 2025-07-24 RX ADMIN — POTASSIUM CHLORIDE 10 MEQ: 7.46 INJECTION, SOLUTION INTRAVENOUS at 12:39

## 2025-07-24 RX ADMIN — AMITRIPTYLINE HYDROCHLORIDE 25 MG: 25 TABLET, COATED ORAL at 20:20

## 2025-07-24 RX ADMIN — DIAZEPAM 20 MG: 5 INJECTION, SOLUTION INTRAMUSCULAR; INTRAVENOUS at 03:02

## 2025-07-24 RX ADMIN — DIAZEPAM 15 MG: 10 INJECTION, SOLUTION INTRAMUSCULAR; INTRAVENOUS at 04:07

## 2025-07-24 RX ADMIN — POTASSIUM CHLORIDE 10 MEQ: 7.46 INJECTION, SOLUTION INTRAVENOUS at 13:41

## 2025-07-24 RX ADMIN — POTASSIUM CHLORIDE 10 MEQ: 7.46 INJECTION, SOLUTION INTRAVENOUS at 09:12

## 2025-07-24 RX ADMIN — DIAZEPAM 5 MG: 5 TABLET ORAL at 16:49

## 2025-07-24 RX ADMIN — MAGNESIUM SULFATE HEPTAHYDRATE 2000 MG: 40 INJECTION, SOLUTION INTRAVENOUS at 08:10

## 2025-07-24 RX ADMIN — CARVEDILOL 6.25 MG: 6.25 TABLET, FILM COATED ORAL at 08:14

## 2025-07-24 RX ADMIN — FOLIC ACID 1 MG: 1 TABLET ORAL at 08:14

## 2025-07-24 RX ADMIN — DIAZEPAM 15 MG: 10 INJECTION, SOLUTION INTRAMUSCULAR; INTRAVENOUS at 22:44

## 2025-07-24 RX ADMIN — CHLORDIAZEPOXIDE HYDROCHLORIDE 25 MG: 25 CAPSULE ORAL at 14:08

## 2025-07-24 RX ADMIN — DIAZEPAM 15 MG: 10 INJECTION, SOLUTION INTRAMUSCULAR; INTRAVENOUS at 18:09

## 2025-07-24 RX ADMIN — CARVEDILOL 6.25 MG: 6.25 TABLET, FILM COATED ORAL at 16:49

## 2025-07-24 RX ADMIN — ASPIRIN 81 MG: 81 TABLET, COATED ORAL at 08:14

## 2025-07-24 RX ADMIN — DIAZEPAM 10 MG: 5 INJECTION, SOLUTION INTRAMUSCULAR; INTRAVENOUS at 23:53

## 2025-07-24 RX ADMIN — POTASSIUM CHLORIDE 10 MEQ: 7.46 INJECTION, SOLUTION INTRAVENOUS at 10:13

## 2025-07-24 RX ADMIN — MAGNESIUM SULFATE HEPTAHYDRATE 2000 MG: 40 INJECTION, SOLUTION INTRAVENOUS at 13:40

## 2025-07-24 RX ADMIN — POTASSIUM CHLORIDE 10 MEQ: 7.46 INJECTION, SOLUTION INTRAVENOUS at 15:16

## 2025-07-24 RX ADMIN — DIAZEPAM 15 MG: 10 INJECTION, SOLUTION INTRAMUSCULAR; INTRAVENOUS at 01:52

## 2025-07-24 RX ADMIN — DIAZEPAM 15 MG: 10 INJECTION, SOLUTION INTRAMUSCULAR; INTRAVENOUS at 21:32

## 2025-07-24 RX ADMIN — THERA TABS 1 TABLET: TAB at 08:14

## 2025-07-24 RX ADMIN — POTASSIUM CHLORIDE 10 MEQ: 7.46 INJECTION, SOLUTION INTRAVENOUS at 06:39

## 2025-07-24 RX ADMIN — DIAZEPAM 15 MG: 10 INJECTION, SOLUTION INTRAMUSCULAR; INTRAVENOUS at 06:10

## 2025-07-24 RX ADMIN — SODIUM CHLORIDE: 0.9 INJECTION, SOLUTION INTRAVENOUS at 06:06

## 2025-07-24 RX ADMIN — THIAMINE HYDROCHLORIDE 500 MG: 100 INJECTION, SOLUTION INTRAMUSCULAR; INTRAVENOUS at 15:17

## 2025-07-24 RX ADMIN — SODIUM CHLORIDE, PRESERVATIVE FREE 10 ML: 5 INJECTION INTRAVENOUS at 20:24

## 2025-07-24 RX ADMIN — AMLODIPINE BESYLATE 5 MG: 5 TABLET ORAL at 08:14

## 2025-07-24 RX ADMIN — DIAZEPAM 5 MG: 10 INJECTION, SOLUTION INTRAMUSCULAR; INTRAVENOUS at 01:27

## 2025-07-24 RX ADMIN — POTASSIUM CHLORIDE 10 MEQ: 7.46 INJECTION, SOLUTION INTRAVENOUS at 08:13

## 2025-07-24 RX ADMIN — TAMSULOSIN HYDROCHLORIDE 0.4 MG: 0.4 CAPSULE ORAL at 20:20

## 2025-07-24 RX ADMIN — DIAZEPAM 15 MG: 10 INJECTION, SOLUTION INTRAMUSCULAR; INTRAVENOUS at 05:07

## 2025-07-24 RX ADMIN — BUPROPION HYDROCHLORIDE 150 MG: 150 TABLET, EXTENDED RELEASE ORAL at 08:14

## 2025-07-24 RX ADMIN — LOSARTAN POTASSIUM 100 MG: 100 TABLET, FILM COATED ORAL at 08:14

## 2025-07-24 RX ADMIN — THIAMINE HYDROCHLORIDE 500 MG: 100 INJECTION, SOLUTION INTRAMUSCULAR; INTRAVENOUS at 08:21

## 2025-07-24 RX ADMIN — THIAMINE HYDROCHLORIDE 500 MG: 100 INJECTION, SOLUTION INTRAMUSCULAR; INTRAVENOUS at 20:24

## 2025-07-24 ASSESSMENT — PAIN SCALES - GENERAL
PAINLEVEL_OUTOF10: 0
PAINLEVEL_OUTOF10: 0

## 2025-07-24 NOTE — PROGRESS NOTES
Attempted to CIWAA patient. Currently sleeping after not resting all night. Respirations even, chest rising and falling, and in not apparent distress.

## 2025-07-24 NOTE — FLOWSHEET NOTE
07/24/25 0120   Vital Signs   Pulse (!) 123   Heart Rate Source Monitor   /74   MAP (Calculated) 87   BP Location Left upper arm   BP Method Automatic   Patient Position Sitting     Went into patients room after bed alarm was going off. Patient standing trying to walk around, sweating profusely. Asking \" where is Neymar\". This RN asked patient where he was at, he stated [facility]. Moderate tremors and obvious hallucinations, along with agitation, and anxiety. Telesitter placed in room. CIWAA 21. 1 mL of iv valium given. 1mL wasted in back omnicell with Ana KIM.    CIWAA protocol valium ordered wrong in MAR. MD notified. After correction by MD 3mL more were needed for his CIWAA of 21. 3mL given and 1mL wasted in back omnicell with Ana KIM.

## 2025-07-24 NOTE — FLOWSHEET NOTE
07/24/25 1553   Vital Signs   Temp 98 °F (36.7 °C)   Temp Source Oral   Pulse 93   Heart Rate Source Monitor   Respirations 16   BP (!) 150/97   MAP (Calculated) 115   BP Location Left upper arm   BP Method Automatic   Patient Position Semi fowlers   Oxygen Therapy   SpO2 98 %   O2 Device None (Room air)     Vitals and reassessment completed. No significant changes. IV moved to left arm. No further needs at this time.     Tina Garrett RN

## 2025-07-24 NOTE — ACP (ADVANCE CARE PLANNING)
Advance Care Planning     General Advance Care Planning (ACP) Conversation    Date of Conversation: 7/24/2025  Conducted with: Patient with Decision Making Capacity  Other persons present: None    Healthcare Decision Maker: No healthcare decision makers have been documented.       Content/Action Overview:  DECLINED ACP Conversation - will revisit periodically  Reviewed DNR/DNI and patient elects Full Code (Attempt Resuscitation)        Length of Voluntary ACP Conversation in minutes:  <16 minutes (Non-Billable)    Amber Cifuentes RN

## 2025-07-24 NOTE — FLOWSHEET NOTE
07/24/25 1306   Vital Signs   Temp 97.4 °F (36.3 °C)   Temp Source Oral   Pulse 92   Heart Rate Source Monitor   Respirations 19   BP (!) 152/98   MAP (Calculated) 116   BP Location Left upper arm   BP Method Automatic   Patient Position High fowlers   Oxygen Therapy   SpO2 97 %   O2 Device None (Room air)     Vitals and reassessment completed. No significant changes. Trial to leave soft wrist restraints off, posey belt remains in place. No further needs at this time. Patient eating lunch.     Tina Garrett RN

## 2025-07-24 NOTE — PROGRESS NOTES
Accessed pt chart and administered medication to aide primary nurse, who is also at bedside. Ana Contreras RN

## 2025-07-24 NOTE — PLAN OF CARE
Problem: Discharge Planning  Goal: Discharge to home or other facility with appropriate resources  7/24/2025 0929 by Tina Garrett RN  Outcome: Progressing  Flowsheets (Taken 7/24/2025 0900)  Discharge to home or other facility with appropriate resources:   Identify barriers to discharge with patient and caregiver   Arrange for needed discharge resources and transportation as appropriate   Identify discharge learning needs (meds, wound care, etc)  7/23/2025 2049 by Chidi Morales RN  Outcome: Progressing  Flowsheets (Taken 7/23/2025 1800 by Tina Garrett RN)  Discharge to home or other facility with appropriate resources:   Identify barriers to discharge with patient and caregiver   Arrange for needed discharge resources and transportation as appropriate   Identify discharge learning needs (meds, wound care, etc)     Problem: Seizure Precautions  Goal: Remains free of injury related to seizures activity  Outcome: Progressing  Flowsheets  Taken 7/24/2025 0800 by Tina Garrett RN  Remains free of injury related to seizure activity: Maintain airway, patient safety  and administer oxygen as ordered  Taken 7/23/2025 2047 by Chidi Morales RN  Remains free of injury related to seizure activity: Monitor patient for seizure activity, document and report duration and description of seizure to Licensed Independent Practitioner     Problem: Safety - Adult  Goal: Free from fall injury  7/24/2025 0929 by Tina Garrett RN  Outcome: Progressing  7/23/2025 2049 by Chidi Morales RN  Outcome: Progressing     Problem: Skin/Tissue Integrity  Goal: Skin integrity remains intact  Description: 1.  Monitor for areas of redness and/or skin breakdown  2.  Assess vascular access sites hourly  3.  Every 4-6 hours minimum:  Change oxygen saturation probe site  4.  Every 4-6 hours:  If on nasal continuous positive airway pressure, respiratory therapy assess nares and determine need for appliance change or resting period  Outcome:

## 2025-07-24 NOTE — CARE COORDINATION
Case Management Assessment  Initial Evaluation    Date/Time of Evaluation: 7/24/2025 7:38 AM  Assessment Completed by: Amber Cifuentes RN    If patient is discharged prior to next notation, then this note serves as note for discharge by case management.    Patient Name: Franklin Mahmood                   YOB: 1960  Diagnosis: Alcoholic ketoacidosis [E87.29]  Hypomagnesemia [E83.42]  Wernicke's encephalopathy [E51.2]  Alcohol withdrawal syndrome without complication (HCC) [F10.930]                   Date / Time: 7/23/2025  1:25 PM    Patient Admission Status: Inpatient   Readmission Risk (Low < 19, Mod (19-27), High > 27): Readmission Risk Score: 11.1    Current PCP: Thea Schuler  PCP verified by CM? Yes    Chart Reviewed: Yes      History Provided by: Patient, Medical Record  Patient Orientation: Alert and Oriented, Person, Self    Patient Cognition: Alert    Hospitalization in the last 30 days (Readmission):  No    If yes, Readmission Assessment in CM Navigator will be completed.    Advance Directives:      Code Status: Full Code   Patient's Primary Decision Maker is: Legal Next of Kin      Discharge Planning:    Patient lives with: Spouse/Significant Other Type of Home: House  Primary Care Giver: Self  Patient Support Systems include: Spouse/Significant Other   Current Financial resources: Other (Comment) ([facility])  Current community resources: None  Current services prior to admission: C-pap            Current DME:              Type of Home Care services:  None    ADLS  Prior functional level: Independent in ADLs/IADLs  Current functional level: Assistance with the following:, Mobility    PT AM-PAC:   /24  OT AM-PAC:   /24    Family can provide assistance at DC: Yes  Would you like Case Management to discuss the discharge plan with any other family members/significant others, and if so, who? No  Plans to Return to Present Housing: Yes  Other Identified Issues/Barriers to RETURNING to current housing:  na  Potential Assistance needed at discharge: Other (Comment) (rehab)            Potential DME:    Patient expects to discharge to: House  Plan for transportation at discharge:      Financial    Payor: [place] [employer] / Plan: [employer] - CHOICE PLUS / Product Type: *No Product type* /     Does insurance require precert for SNF: Yes    Potential assistance Purchasing Medications: No  Meds-to-Beds request:        [id number] - [place], [place] - [address] - P [phone] - F [phone]  [address]  [facility] [address]  Phone: [phone] Fax: [phone]      Notes:    Factors facilitating achievement of predicted outcomes: Family support and Cooperative    Barriers to discharge: etoh    Additional Case Management Notes: Reviewed chart and met with pt at bedside. Role of CM explained. IPTA. Lives home with spouse. Pt is active with OP therapy for unsteady ambulation. Resource folder and Taylor Celaya's info provided to pt.     The Plan for Transition of Care is related to the following treatment goals of Alcoholic ketoacidosis [E87.29]  Hypomagnesemia [E83.42]  Wernicke's encephalopathy [E51.2]  Alcohol withdrawal syndrome without complication (HCC) [F10.930]    IF APPLICABLE: The Patient and/or patient representative Franklin and his family were provided with a choice of provider and agrees with the discharge plan. Freedom of choice list with basic dialogue that supports the patient's individualized plan of care/goals and shares the quality data associated with the providers was provided to:     Patient Representative Name:       The Patient and/or Patient Representative Agree with the Discharge Plan?      Amber Cifuentes RN, CDP  Case Management Department  Ph: [phone] Fax: [phone]

## 2025-07-24 NOTE — PLAN OF CARE
Problem: Discharge Planning  Goal: Discharge to home or other facility with appropriate resources  7/23/2025 2049 by Chidi Morales, RN  Outcome: Progressing  Flowsheets (Taken 7/23/2025 1800 by Tina Garrett, RN)  Discharge to home or other facility with appropriate resources:   Identify barriers to discharge with patient and caregiver   Arrange for needed discharge resources and transportation as appropriate   Identify discharge learning needs (meds, wound care, etc)  7/23/2025 1753 by Tina Garrett RN  Outcome: Progressing  Flowsheets (Taken 7/23/2025 1752)  Discharge to home or other facility with appropriate resources:   Identify barriers to discharge with patient and caregiver   Identify discharge learning needs (meds, wound care, etc)   Refer to discharge planning if patient needs post-hospital services based on physician order or complex needs related to functional status, cognitive ability or social support system

## 2025-07-24 NOTE — FLOWSHEET NOTE
07/24/25 0920   Vital Signs   Temp 97.5 °F (36.4 °C)   Temp Source Axillary   Pulse 85   Heart Rate Source Monitor   Respirations 21   BP (!) 145/96   MAP (Calculated) 112   BP Location Left upper arm   BP Method Automatic   Patient Position Semi fowlers   Oxygen Therapy   SpO2 100 %   O2 Device None (Room air)     Vitals obtained this AM but was not documented - above are vitals that were re-taken after medication administration. All meds given this AM without difficulty; but patient did require a lot of coaching and encouragement to follow commands and swallow pills. Patient was offered drinks of water in which he accepted but refused breakfast. Patient remains in posey and soft wrist restraints, see flowsheet. Patient's CIWA score noted.     Offered patient another drink of water - patient refused at this time.     Tina Garrett RN

## 2025-07-24 NOTE — CONSULTS
Inpatient Neurology consult        [facility]      Franklin Mahmood  1960    Date of Service: 7/24/2025    Referring Physician: Madhuri Velasco,*      Reason for the consult and CC: Questionable Wernicke's    HPI:   The patient is a 64 y.o. male with a past medical history of alcohol abuse, hypertension, hyperlipidemia, BPH, and mood disorder originally presenting to the hospital for concerns of gait instability and falling at home.  Patient reports yesterday (7/23), he stood up felt extremely lightheaded and collapsed to the ground multiple times.  He denies any loss of consciousness or hitting of his head.  Patient reports that he typically drinks approximately 6 shots of bourbon every single day with his last drink around 4 PM.  Patient states that he has struggled with alcoholism and relapsed back in 2009 and has continuously drink since.  He reports since Thursday he has been trying to wean himself down.  He denies any history of seizures.  Per patient's wife, patient was hallucinating people in their home that are not truly there and believes they were playing ping-pong.  He denies any tobacco use or recreational drug usage. Neurology has been consulted for further evaluation and management of possible Wernicke's encephalopathy.        Constitutional:   Vitals:    07/24/25 0355 07/24/25 0454 07/24/25 0600 07/24/25 0920   BP: (!) 165/94 (!) 144/91 129/80 (!) 145/96   Pulse: (!) 107 (!) 105 (!) 110 85   Resp: 20   21   Temp: 98.8 °F (37.1 °C)   97.5 °F (36.4 °C)   TempSrc: Axillary   Axillary   SpO2: 97%   100%   Weight:       Height:         I personally reviewed and updated social history, past medical history, medications, allergy, surgical history, and family history as documented in the patient's electronic health records.     ROS: 10-14 ROS reviewed with the patient/nurse/family which were unremarkable except mentioned in H&P.    General appearance: Chronically

## 2025-07-24 NOTE — PROGRESS NOTES
Occupational/Physical Therapy  Orders received, chart reviewed. Patient held per RN request due to status, in Posey and wrist restraints at this time. Will reattempt as patient status and therapy schedules allow.    Tamara Zayas, OTR/L [id number]  Gigi Celis, PT, DPT

## 2025-07-24 NOTE — PROGRESS NOTES
Patient resting comfortably in bed. IV potassium infusing. Magnesium replacement completed. Restraints remain in place. Patient offered drink of water and pt declined. No further needs at this time.     Tina Garrett RN

## 2025-07-24 NOTE — PROGRESS NOTES
[facility] Progress Note    Daily Progress Note for 2025 8:22 PM /0307-01  Franklin Mahomod : 1960 Age: 64 y.o. Sex: male  Length of Stay:  1    Interval History:      CC: F/U Alcohol Intoxication (Pt arrives via EMS with alcohol withdrawal, falls, and having delusions. EMS reports pt had 2 falls for them (no LOC -Blood thinners). Pt states he has been going to PT for his gait. Pt drinks a bottle of whiskey every 2-3 days. Last drink @ 1700 yesterday )      Subjective:       Has had some hallucinations  Conversational but tangential  Electrolytes low    Objective:     Vitals:    25 1553 25 1648 25 1804 25 1945   BP: (!) 150/97 (!) 157/102 (!) 152/89 (!) 147/95   Pulse: 93 93 99 87   Resp: 16   16   Temp: 98 °F (36.7 °C)   98.1 °F (36.7 °C)   TempSrc: Oral   Oral   SpO2: 98%   97%   Weight:       Height:              Intake/Output Summary (Last 24 hours) at 2025  Last data filed at 2025 1847  Gross per 24 hour   Intake 180 ml   Output 3150 ml   Net -2970 ml     Body mass index is 28.89 kg/m².    Physical Exam:  General: Cooperative, pleasant/Ill appearing, on  Nasal cannula  HEENT:  Head: normocephalic,atraumatic, anicteric sclera, clear conjunctiva  Neck: Normal size, Jugular venous pulsations: normal  Respiratory:unlabored breathing, clear to auscultation with no crackles, wheezes rhonchi  Heart: Regular rate and rhythm, S1, S2-normal, No murmurs  Abdomen: soft, nondistended, nontender, normoactive bowel sounds,  Neurological/Psych: Alert and oriented times three, no focal neurological deficits, Mood and affect appropriate.  Skin: No obvious rashes    Extremities:  no edema, Pedal pulses 2+ bilaterally    Scheduled Medications:  thiamine, 500 mg, TID   Followed by  [START ON 2025] thiamine, 100 mg, Daily  sodium chloride flush, 5-40 mL, 2 times per day  multivitamin, 1 tablet, Daily  folic acid, 1 mg, Daily  sodium chloride flush, 5-40 mL,

## 2025-07-24 NOTE — PROGRESS NOTES
Bedside report and transfer of care given to Ethel Barnes. Pt currently resting in bed with the call light within reach. Pt denies any other care needs at this time. Pt stable at this time.    Tina Garrett RN

## 2025-07-25 LAB
ALBUMIN SERPL-MCNC: 3.7 G/DL (ref 3.4–5)
ALBUMIN/GLOB SERPL: 1.7 {RATIO} (ref 1.1–2.2)
ALP SERPL-CCNC: 148 U/L (ref 40–129)
ALT SERPL-CCNC: 50 U/L (ref 10–40)
AMMONIA PLAS-SCNC: 15 UMOL/L (ref 16–60)
ANION GAP SERPL CALCULATED.3IONS-SCNC: 15 MMOL/L (ref 3–16)
AST SERPL-CCNC: 57 U/L (ref 15–37)
BASOPHILS # BLD: 0.1 K/UL (ref 0–0.2)
BASOPHILS NFR BLD: 1.4 %
BILIRUB SERPL-MCNC: 1.3 MG/DL (ref 0–1)
BUN SERPL-MCNC: 4 MG/DL (ref 7–20)
CALCIUM SERPL-MCNC: 9.5 MG/DL (ref 8.3–10.6)
CHLORIDE SERPL-SCNC: 100 MMOL/L (ref 99–110)
CO2 SERPL-SCNC: 23 MMOL/L (ref 21–32)
CREAT SERPL-MCNC: 0.7 MG/DL (ref 0.8–1.3)
DEPRECATED RDW RBC AUTO: 14.5 % (ref 12.4–15.4)
EOSINOPHIL # BLD: 0.1 K/UL (ref 0–0.6)
EOSINOPHIL NFR BLD: 2.8 %
GFR SERPLBLD CREATININE-BSD FMLA CKD-EPI: >90 ML/MIN/{1.73_M2}
GLUCOSE BLD-MCNC: 118 MG/DL (ref 70–99)
GLUCOSE SERPL-MCNC: 167 MG/DL (ref 70–99)
HAV IGM SERPL QL IA: NORMAL
HBV CORE IGM SERPL QL IA: NORMAL
HBV SURFACE AG SERPL QL IA: NORMAL
HCT VFR BLD AUTO: 41.7 % (ref 40.5–52.5)
HCV AB SERPL QL IA: NORMAL
HGB BLD-MCNC: 13.8 G/DL (ref 13.5–17.5)
INR PPP: 1.01 (ref 0.86–1.14)
LYMPHOCYTES # BLD: 0.6 K/UL (ref 1–5.1)
LYMPHOCYTES NFR BLD: 14.1 %
MAGNESIUM SERPL-MCNC: 1.43 MG/DL (ref 1.8–2.4)
MAGNESIUM SERPL-MCNC: 1.74 MG/DL (ref 1.8–2.4)
MCH RBC QN AUTO: 30.7 PG (ref 26–34)
MCHC RBC AUTO-ENTMCNC: 33 G/DL (ref 31–36)
MCV RBC AUTO: 93.1 FL (ref 80–100)
MONOCYTES # BLD: 0.5 K/UL (ref 0–1.3)
MONOCYTES NFR BLD: 11.7 %
NEUTROPHILS # BLD: 3.2 K/UL (ref 1.7–7.7)
NEUTROPHILS NFR BLD: 70 %
PERFORMED ON: ABNORMAL
PLATELET # BLD AUTO: 103 K/UL (ref 135–450)
PMV BLD AUTO: 9.5 FL (ref 5–10.5)
POTASSIUM SERPL-SCNC: 2.9 MMOL/L (ref 3.5–5.1)
POTASSIUM SERPL-SCNC: 3.2 MMOL/L (ref 3.5–5.1)
PROT SERPL-MCNC: 5.9 G/DL (ref 6.4–8.2)
PROTHROMBIN TIME: 13.6 SEC (ref 12.1–14.9)
RBC # BLD AUTO: 4.48 M/UL (ref 4.2–5.9)
SODIUM SERPL-SCNC: 138 MMOL/L (ref 136–145)
WBC # BLD AUTO: 4.5 K/UL (ref 4–11)

## 2025-07-25 PROCEDURE — 84132 ASSAY OF SERUM POTASSIUM: CPT

## 2025-07-25 PROCEDURE — 6360000002 HC RX W HCPCS

## 2025-07-25 PROCEDURE — 6370000000 HC RX 637 (ALT 250 FOR IP)

## 2025-07-25 PROCEDURE — 82140 ASSAY OF AMMONIA: CPT

## 2025-07-25 PROCEDURE — 85610 PROTHROMBIN TIME: CPT

## 2025-07-25 PROCEDURE — 83735 ASSAY OF MAGNESIUM: CPT

## 2025-07-25 PROCEDURE — 2500000003 HC RX 250 WO HCPCS

## 2025-07-25 PROCEDURE — 99232 SBSQ HOSP IP/OBS MODERATE 35: CPT | Performed by: INTERNAL MEDICINE

## 2025-07-25 PROCEDURE — 6360000002 HC RX W HCPCS: Performed by: STUDENT IN AN ORGANIZED HEALTH CARE EDUCATION/TRAINING PROGRAM

## 2025-07-25 PROCEDURE — 80074 ACUTE HEPATITIS PANEL: CPT

## 2025-07-25 PROCEDURE — 80053 COMPREHEN METABOLIC PANEL: CPT

## 2025-07-25 PROCEDURE — 85025 COMPLETE CBC W/AUTO DIFF WBC: CPT

## 2025-07-25 PROCEDURE — APPSS30 APP SPLIT SHARED TIME 16-30 MINUTES

## 2025-07-25 PROCEDURE — 2060000000 HC ICU INTERMEDIATE R&B

## 2025-07-25 PROCEDURE — 36415 COLL VENOUS BLD VENIPUNCTURE: CPT

## 2025-07-25 PROCEDURE — 6370000000 HC RX 637 (ALT 250 FOR IP): Performed by: INTERNAL MEDICINE

## 2025-07-25 PROCEDURE — 2580000003 HC RX 258: Performed by: INTERNAL MEDICINE

## 2025-07-25 RX ORDER — OLMESARTAN MEDOXOMIL AND HYDROCHLOROTHIAZIDE 40/25 40; 25 MG/1; MG/1
1 TABLET ORAL DAILY
Status: ON HOLD | COMMUNITY
End: 2025-07-30 | Stop reason: HOSPADM

## 2025-07-25 RX ORDER — NALTREXONE HYDROCHLORIDE 50 MG/1
100 TABLET, FILM COATED ORAL DAILY
COMMUNITY

## 2025-07-25 RX ORDER — CLONIDINE HYDROCHLORIDE 0.2 MG/1
0.2 TABLET ORAL 2 TIMES DAILY
Status: ON HOLD | COMMUNITY
End: 2025-07-30

## 2025-07-25 RX ORDER — IBUPROFEN 800 MG/1
800 TABLET, FILM COATED ORAL EVERY 8 HOURS PRN
Status: ON HOLD | COMMUNITY
End: 2025-07-30 | Stop reason: HOSPADM

## 2025-07-25 RX ORDER — CHLORDIAZEPOXIDE HYDROCHLORIDE 10 MG/1
10 CAPSULE, GELATIN COATED ORAL 3 TIMES DAILY
Status: DISCONTINUED | OUTPATIENT
Start: 2025-07-25 | End: 2025-07-26

## 2025-07-25 RX ORDER — LABETALOL HYDROCHLORIDE 5 MG/ML
10 INJECTION, SOLUTION INTRAVENOUS EVERY 6 HOURS PRN
Status: DISCONTINUED | OUTPATIENT
Start: 2025-07-25 | End: 2025-08-02 | Stop reason: HOSPADM

## 2025-07-25 RX ORDER — ERGOCALCIFEROL 1.25 MG/1
50000 CAPSULE, LIQUID FILLED ORAL WEEKLY
COMMUNITY

## 2025-07-25 RX ORDER — TADALAFIL 20 MG/1
20 TABLET ORAL PRN
COMMUNITY

## 2025-07-25 RX ADMIN — AMLODIPINE BESYLATE 5 MG: 5 TABLET ORAL at 10:22

## 2025-07-25 RX ADMIN — CHLORDIAZEPOXIDE HYDROCHLORIDE 10 MG: 10 CAPSULE ORAL at 21:34

## 2025-07-25 RX ADMIN — DIAZEPAM 15 MG: 10 INJECTION, SOLUTION INTRAMUSCULAR; INTRAVENOUS at 04:11

## 2025-07-25 RX ADMIN — LOSARTAN POTASSIUM 100 MG: 100 TABLET, FILM COATED ORAL at 10:22

## 2025-07-25 RX ADMIN — SODIUM CHLORIDE, SODIUM LACTATE, POTASSIUM CHLORIDE, AND CALCIUM CHLORIDE: .6; .31; .03; .02 INJECTION, SOLUTION INTRAVENOUS at 13:55

## 2025-07-25 RX ADMIN — MAGNESIUM SULFATE HEPTAHYDRATE 2000 MG: 40 INJECTION, SOLUTION INTRAVENOUS at 15:13

## 2025-07-25 RX ADMIN — CARVEDILOL 6.25 MG: 6.25 TABLET, FILM COATED ORAL at 10:22

## 2025-07-25 RX ADMIN — POTASSIUM CHLORIDE 10 MEQ: 7.46 INJECTION, SOLUTION INTRAVENOUS at 17:28

## 2025-07-25 RX ADMIN — ENOXAPARIN SODIUM 30 MG: 100 INJECTION SUBCUTANEOUS at 10:22

## 2025-07-25 RX ADMIN — POTASSIUM CHLORIDE 10 MEQ: 7.46 INJECTION, SOLUTION INTRAVENOUS at 12:01

## 2025-07-25 RX ADMIN — SODIUM CHLORIDE, SODIUM LACTATE, POTASSIUM CHLORIDE, AND CALCIUM CHLORIDE: .6; .31; .03; .02 INJECTION, SOLUTION INTRAVENOUS at 03:58

## 2025-07-25 RX ADMIN — CHLORDIAZEPOXIDE HYDROCHLORIDE 25 MG: 25 CAPSULE ORAL at 10:22

## 2025-07-25 RX ADMIN — POTASSIUM CHLORIDE 10 MEQ: 7.46 INJECTION, SOLUTION INTRAVENOUS at 12:53

## 2025-07-25 RX ADMIN — THERA TABS 1 TABLET: TAB at 10:22

## 2025-07-25 RX ADMIN — POTASSIUM CHLORIDE 10 MEQ: 7.46 INJECTION, SOLUTION INTRAVENOUS at 16:25

## 2025-07-25 RX ADMIN — ASPIRIN 81 MG: 81 TABLET, COATED ORAL at 10:22

## 2025-07-25 RX ADMIN — SODIUM CHLORIDE, PRESERVATIVE FREE 10 ML: 5 INJECTION INTRAVENOUS at 10:23

## 2025-07-25 RX ADMIN — POTASSIUM CHLORIDE 10 MEQ: 7.46 INJECTION, SOLUTION INTRAVENOUS at 14:00

## 2025-07-25 RX ADMIN — POTASSIUM CHLORIDE 10 MEQ: 7.46 INJECTION, SOLUTION INTRAVENOUS at 15:15

## 2025-07-25 RX ADMIN — POTASSIUM BICARBONATE 40 MEQ: 782 TABLET, EFFERVESCENT ORAL at 20:09

## 2025-07-25 RX ADMIN — THIAMINE HYDROCHLORIDE 500 MG: 100 INJECTION, SOLUTION INTRAMUSCULAR; INTRAVENOUS at 15:14

## 2025-07-25 RX ADMIN — SODIUM CHLORIDE, PRESERVATIVE FREE 10 ML: 5 INJECTION INTRAVENOUS at 21:35

## 2025-07-25 RX ADMIN — MAGNESIUM SULFATE HEPTAHYDRATE 2000 MG: 40 INJECTION, SOLUTION INTRAVENOUS at 20:09

## 2025-07-25 RX ADMIN — CARVEDILOL 6.25 MG: 6.25 TABLET, FILM COATED ORAL at 17:15

## 2025-07-25 RX ADMIN — AMITRIPTYLINE HYDROCHLORIDE 25 MG: 25 TABLET, COATED ORAL at 21:34

## 2025-07-25 RX ADMIN — THIAMINE HYDROCHLORIDE 500 MG: 100 INJECTION, SOLUTION INTRAMUSCULAR; INTRAVENOUS at 21:35

## 2025-07-25 RX ADMIN — TAMSULOSIN HYDROCHLORIDE 0.4 MG: 0.4 CAPSULE ORAL at 21:34

## 2025-07-25 RX ADMIN — BUPROPION HYDROCHLORIDE 150 MG: 150 TABLET, EXTENDED RELEASE ORAL at 10:22

## 2025-07-25 RX ADMIN — THIAMINE HYDROCHLORIDE 500 MG: 100 INJECTION, SOLUTION INTRAMUSCULAR; INTRAVENOUS at 10:22

## 2025-07-25 RX ADMIN — FOLIC ACID 1 MG: 1 TABLET ORAL at 10:22

## 2025-07-25 NOTE — PLAN OF CARE
Problem: Discharge Planning  Goal: Discharge to home or other facility with appropriate resources  7/25/2025 1248 by Doris Mcknight RN  Outcome: Progressing  7/24/2025 2328 by Cj Flores RN  Outcome: Progressing  Flowsheets (Taken 7/24/2025 2328)  Discharge to home or other facility with appropriate resources:   Identify barriers to discharge with patient and caregiver   Arrange for needed discharge resources and transportation as appropriate     Problem: Seizure Precautions  Goal: Remains free of injury related to seizures activity  7/25/2025 1248 by Doris Mcknight RN  Outcome: Progressing  7/24/2025 2328 by Cj Flores RN  Outcome: Progressing  Flowsheets (Taken 7/24/2025 2328)  Remains free of injury related to seizure activity:   Maintain airway, patient safety  and administer oxygen as ordered   Monitor patient for seizure activity, document and report duration and description of seizure to Licensed Independent Practitioner   Reorient patient post seizure   If seizure occurs, turn patient to side and suction secretions as needed     Problem: Safety - Adult  Goal: Free from fall injury  7/25/2025 1248 by Doris Mcknight RN  Outcome: Progressing  7/24/2025 2328 by Cj Flores RN  Outcome: Progressing  Flowsheets (Taken 7/24/2025 2328)  Free From Fall Injury: Instruct family/caregiver on patient safety     Problem: Skin/Tissue Integrity  Goal: Skin integrity remains intact  Description: 1.  Monitor for areas of redness and/or skin breakdown  2.  Assess vascular access sites hourly  3.  Every 4-6 hours minimum:  Change oxygen saturation probe site  4.  Every 4-6 hours:  If on nasal continuous positive airway pressure, respiratory therapy assess nares and determine need for appliance change or resting period  7/25/2025 1248 by Doris Mcknight RN  Outcome: Progressing  7/24/2025 2328 by Cj Flores RN  Outcome: Progressing  Flowsheets (Taken 7/24/2025 2328)  Skin Integrity Remains Intact:   Monitor for    Problem: Physical Therapy  Goal: Physical Therapy Goal  Description: Goals to be met by: 10/11/2022    Patient will increase functional independence with mobility by performing:  Supine to sit with MInimal Assistance  Sit to supine with MInimal Assistance  Sit to stand transfer with Moderate Assistance  Gait  x 50 feet with Maximum Assistance using Rolling Walker.     9/17/2022 1418 by Jagjit Henao PT  Outcome: Ongoing, Progressing

## 2025-07-25 NOTE — PROGRESS NOTES
Pt in bed during assessment. Pt is alert and oriented at this time, pt able to state name, birthday, where he is, date and why he is in the hospital. But pt appears confused states he had chicken and rice for breakfast. Pt accepting of morning medications. Allowing staff to recheck labs. No new concerns at this time. Bed alarm in place. Call light in reach. Tele-sitter at bedside. Side rail x3.     Bedside Mobility Assessment Tool (BMAT):     Assessment Level 1- Sit and Shake    1. From a semi-reclined position, ask patient to sit up and rotate to a seated position at the side of the bed. Can use the bedrail.    2. Ask patient to reach out and grab your hand and shake making sure patient reaches across his/her midline.   Pass- Patient is able to come to a seated position, maintain core strength. Maintains seated balance while reaching across midline. Move on to Assessment Level 2.     Assessment Level 2- Stretch and Point   1. With patient in seated position at the side of the bed, have patient place both feet on the floor (or stool) with knees no higher than hips.    2. Ask patient to stretch one leg and straighten the knee, then bend the ankle/flex and point the toes. If appropriate, repeat with the other leg.   Fail- Patient is unable to complete task. Patient is MOBILITY LEVEL 2.     Assessment Level 3- Stand   1. Ask patient to elevate off the bed or chair (seated to standing) using an assistive device (cane, bedrail).    2. Patient should be able to raise buttocks off be and hold for a count of five. May repeat once.   Fail- Patient unable to demonstrate standing stability. Patient is MOBILITY LEVEL 3.     Assessment Level 4- Walk   1. Ask patient to march in place at bedside.    2. Then ask patient to advance step and return each foot. Some medical conditions may render a patient from stepping backwards, use your best clinical judgement.   Fail- Patient not able to complete tasks OR requires use of assistive

## 2025-07-25 NOTE — PLAN OF CARE
Problem: Discharge Planning  Goal: Discharge to home or other facility with appropriate resources  7/24/2025 2328 by Cj Flores RN  Outcome: Progressing  Flowsheets (Taken 7/24/2025 2328)  Discharge to home or other facility with appropriate resources:   Identify barriers to discharge with patient and caregiver   Arrange for needed discharge resources and transportation as appropriate     Problem: Seizure Precautions  Goal: Remains free of injury related to seizures activity  7/24/2025 2328 by Cj Flores RN  Outcome: Progressing  Flowsheets (Taken 7/24/2025 2328)  Remains free of injury related to seizure activity:   Maintain airway, patient safety  and administer oxygen as ordered   Monitor patient for seizure activity, document and report duration and description of seizure to Licensed Independent Practitioner   Reorient patient post seizure   If seizure occurs, turn patient to side and suction secretions as needed  7/24/2025 0929 by Tina Garrett RN  Outcome: Progressing  Flowsheets  Taken 7/24/2025 0800 by Tina Garrett RN  Remains free of injury related to seizure activity: Maintain airway, patient safety  and administer oxygen as ordered  Taken 7/23/2025 2047 by Chidi Morales RN  Remains free of injury related to seizure activity: Monitor patient for seizure activity, document and report duration and description of seizure to Licensed Independent Practitioner     Problem: Safety - Adult  Goal: Free from fall injury  7/24/2025 2328 by Cj Flores RN  Outcome: Progressing  Flowsheets (Taken 7/24/2025 2328)  Free From Fall Injury: Instruct family/caregiver on patient safety  7/24/2025 0929 by Tina Garrett RN  Outcome: Progressing     Problem: Skin/Tissue Integrity  Goal: Skin integrity remains intact  Description: 1.  Monitor for areas of redness and/or skin breakdown  2.  Assess vascular access sites hourly  3.  Every 4-6 hours minimum:  Change oxygen saturation probe site  4.  Every 4-6 hours:  If

## 2025-07-25 NOTE — PROGRESS NOTES
[facility] Progress Note    Daily Progress Note for 2025 9:18 AM /0307-01  Franklin Mahmood : 1960 Age: 64 y.o. Sex: male  Length of Stay:  3    Interval History:      CC: F/U Alcohol Intoxication (Pt arrives via EMS with alcohol withdrawal, falls, and having delusions. EMS reports pt had 2 falls for them (no LOC -Blood thinners). Pt states he has been going to PT for his gait. Pt drinks a bottle of whiskey every 2-3 days. Last drink @ 1700 yesterday )    Subjective:       Drowsy but wakes up to answer questions    Objective:     Vitals:    25 0008 25 0425 25 0814 25 0845   BP: (!) 157/94 (!) 144/94 (!) 165/102 135/74   Pulse: 93 97 97    Resp: 20  20    Temp: 98.5 °F (36.9 °C) 98.2 °F (36.8 °C)     TempSrc: Axillary Axillary     SpO2: 93% 94% 94%    Weight:       Height:              Intake/Output Summary (Last 24 hours) at 2025 0918  Last data filed at 2025 0848  Gross per 24 hour   Intake 3365.14 ml   Output 2200 ml   Net 1165.14 ml     Body mass index is 28.89 kg/m².    Physical Exam:  General: Cooperative, pleasant/Ill appearing, on  Nasal cannula  HEENT:  Head: normocephalic,atraumatic, anicteric sclera, clear conjunctiva  Neck: Normal size, Jugular venous pulsations: normal  Respiratory:unlabored breathing, clear to auscultation with no crackles, wheezes rhonchi  Heart: Regular rate and rhythm, S1, S2-normal, No murmurs  Abdomen: soft, nondistended, nontender, normoactive bowel sounds,  Neurological/Psych: Alert and oriented times three, no focal neurological deficits, Mood and affect appropriate.  Skin: No obvious rashes    Extremities:  no edema, Pedal pulses 2+ bilaterally    Scheduled Medications:  chlordiazePOXIDE, 10 mg, TID  thiamine, 100 mg, Daily  sodium chloride flush, 5-40 mL, 2 times per day  multivitamin, 1 tablet, Daily  folic acid, 1 mg, Daily  sodium chloride flush, 5-40 mL, 2 times per day  enoxaparin, 30 mg,

## 2025-07-25 NOTE — PROGRESS NOTES
Occupational Therapy  Orders received, chart reviewed. Patient held per RN request due to status, in Posey and wrist restraints at this time. Will reattempt as patient status and therapy schedules allow.     Addendum 12:50 pm: RN reports that patient is out of restraints and now appropriate to attempt therapy evaluation. Patient asleep upon OT arrival and difficult to arouse. Patient briefly awakens twice to therapist voice to state, \"Shut up\". Patient unable to remain alert to participate in therapy evaluation at this time. RN aware.     Barb Farris, OTR/L 1419

## 2025-07-25 NOTE — PROGRESS NOTES
2346-FYI Dr li that the pt BP is 171/112 and still combative. And informed him the total valium that was given.   2350-He considered ICU transfer that sounds like the patient has serious withdrawal. And to contact dr. Coker again.   2359- communicated to Dr. Coker about Dr. Li suggestion.  0012- Dr. Coker hold off the transfer.    Will continue to monitor patient.

## 2025-07-25 NOTE — PROGRESS NOTES
Pt continues to refuse having labs drawn at this time. Pt is agitated and requesting to be left alone.

## 2025-07-25 NOTE — PROGRESS NOTES
CODE [place]    Event Date: 7/24/25  Time Called: 2124  Arrival Time: 2126  Event End Time: 2130    Response Team/Staff Involved:     [] Behavior Health Staff:     [x] Security:   [] Maintenance:    [] /Unit Charge RN:   [x] Clinical :    [] Transport:   [x] RN:   [x] Tech/PCA:   [] Additional Staff:     Patient Location:[facility]    Reason for Code Violet:     [] Dementia/Confusion [] Delirium Post Anesthesia [] Physical Assault    [] Substance Abuse  [] Behavioral   [] Verbal Assault    [] Visitor Involvement [x] Other (comment below)     Additional Notes Leading to Event: pt here with CIWA protocol. Confused and attempt to get out of bed, unsteady and intermittently confused. Initially asks staff to \"leave my house, or leave this house\" then tells the provider he is at [facility]. Aggressive with staff and not cooperative. CIWA score and medicated per PRN orders. Pt was laready in lap belt for safety- continue lap belt.      Interventions Utilized:     [] 2-point Restraint   [] 4-point Restraint      [] Mechanical Restraint  [] Restraint Chair   [] Seclusion Initiated   [x] Medications Administered   [] Verbally De-escalated  [] Statement of Belief Initiated    [] 72 Hour Hold Initiated  [] Local Law Enforcement Called   [] Intubation/Moved to HLOC [] EPIC \"Risk for Violence\" FYI placed (describe situation on FYI)       [] Signed out AMA   []  Initiated   [] Other (comment below)     Additional Interventions Notes: Pt positioned for comfort and lights dimmed for rest. [employer] continues.      Patient Response to Intervention: became relaxed shortly after medication.    Additional Notes: Additional PRN orders placed by provider.    Attending MD: Dr Coker responded to bedside    Debriefing Post Code Violet Complete: [x] Yes        Primary Nurse eSignature: Electronically signed by Seema Caraballo RN on 7/24/25 at 11:22 PM EDT

## 2025-07-25 NOTE — PROGRESS NOTES
Franklin Mahmood  Neurology Follow-up  [facility]  Neurology    Date of Service: 7/25/2025    Subjective:   CC: Follow up today regarding:  Questionable Wernicke's     Events noted. Chart and lab reviewed.       ROS : A 10-12 system review obtained and updated today and is unremarkable except as mentioned  in my interval history.     Past medical history, social history, medication and family history reviewed.       Objective:  Exam:   Constitutional:   Vitals:    07/25/25 0511 07/25/25 1007 07/25/25 1245 07/25/25 1345   BP:  (!) 138/92 (!) 160/102 (!) 144/95   Pulse: (!) 105 (!) 117 97    Resp:  20 20    Temp:  99.3 °F (37.4 °C) 98.4 °F (36.9 °C)    TempSrc:  Axillary Axillary    SpO2:  96% 98%    Weight:       Height:         General appearance: Chronically ill-appearing  Mental Status:   Orientation to person, place, situation  Memory fair immediate recall and intact remote memory               Attention intact as able to attend well to the exam                Language fluent in conversation              Comprehension intact; follows simple commands     Cranial Nerves:   II: Visual fields: Full without extinction on confrontational testing.   III,IV,VI: Pupils: equal, round, reactive to light, bilaterally; Extra Ocular Movements are intact. No nystagmus  V: Facial sensation is intact to pin prick and light touch   VII: Facial strength and movements: intact and symmetric  VIII: Hearing: Intact to finger rub bilaterally   IX, XI: Normal palatal elevation and shoulder shrug  XII: Tongue movements are normal; tongue midline with protrusion   Musculoskeletal: 4+/5 in all 4 extremities.  Good range of motion.  No muscle atrophy.    Tone: Normal tone.   Reflexes: Bilateral biceps 2/4 and patellar 2/4   Planters: Flexor bilaterally  Coordination: no pronator drift, no dysmetria with FNF   Sensation: normal in both arms and legs.      Data  LABS:   Lab Results   Component Value Date/Time     07/25/2025 10:25

## 2025-07-25 NOTE — PROGRESS NOTES
BP (!) 151/88   Pulse 86   Temp 98.1 °F (36.7 °C) (Oral)   Resp 16   Ht 1.88 m (6' 2\")   Wt 102.1 kg (225 lb)   SpO2 97%   BMI 28.89 kg/m²     Patient alert and oriented resting in bed, calm and cooperative, watching tv. With telemetry. On room air. With malewick connected to the suction. With posey belt soft restraints on his belly. No wrist restraint noted. Assessment completed. Respiration easy, even and unlabored. Patient tolerated night meds well. Call light and bedside table within reach. Bed at lowest position, locked, side rails x4, bed alarm on. Pt denies needs at this time.

## 2025-07-25 NOTE — FLOWSHEET NOTE
07/24/25 2125   CIWA-Ar   Pulse 85   Nausea and Vomiting 0   Tactile Disturbances 0   Tremor 4   Auditory Disturbances 0   Paroxysmal Sweats 1   Visual Disturbances 0   Anxiety 4   Headache, Fullness in Head 0   Agitation 6   Orientation and Clouding of Sensorium 1   CIWA-Ar Total (!) 16     Patient become agitated and does not want to lift him to bed and trying to be combative, charge nurse informed and call for code violet- supervisor and security came to help. Prn meds given per protocol. Dr. Coker is at the bedside and aware for the meds. He ordered prn meds just in case.

## 2025-07-25 NOTE — FLOWSHEET NOTE
07/25/25 0407   CIWA-Ar   BP   (patient tries to hit when getting his vitals)   Pulse   (patient tries to hit when getting his vitals)   Nausea and Vomiting 0   Tactile Disturbances 0   Tremor 4   Auditory Disturbances 0   Paroxysmal Sweats 0   Visual Disturbances 0   Anxiety 4   Headache, Fullness in Head 3   Agitation 4   Orientation and Clouding of Sensorium 1   CIWA-Ar Total (!) 16     Patient is not cooperating and tries to hit anybody when trying to get his vitals. Patient says that he is in his house and always telling susu where you at. Verified orders and administered as per protocol. Patient feet is hanging on bed, instructed him to bring back to bed but he refused to do so he said he is comfortable of that, when tries to help him back he kicks his legs.

## 2025-07-25 NOTE — PROGRESS NOTES
Attempted to CIWA patient but he is currently sleeping. Respiration regular, unlabored. No forms of distress noted. Will continue to monitor patient.

## 2025-07-25 NOTE — PROGRESS NOTES
Family brought medications from home. Medication list updated on the chart. Medications in pt room in closet in belonging bag.

## 2025-07-25 NOTE — PROGRESS NOTES
Billy, the phlebotomist said that the patient refused to draw his blood. Informed charge nurse and will inform am nurse for the blood drawn in am shift.

## 2025-07-25 NOTE — PROGRESS NOTES
2011- FYI Dr. Li that the patient repeat potassium was 3.1 from 2.9. patient had a total of 7 bags of potasssium as replacement today. He said to continue to replace.   2206- informed dr. Li about the latest mg as of 1146 and also cannot give the replacement because patient is combative does not want to take it.

## 2025-07-25 NOTE — PROGRESS NOTES
Transferred care to JUDY George. Face to face bedside report given, no need voiced at this time. Pt awake in bed.   No signs of distress noted.  Call light within reach.   Still with posey belt restraints.

## 2025-07-25 NOTE — PROGRESS NOTES
This nurse at bedside, pt lethargic. Per Dr sheriff at bedside hold dose of scheduled librium at this time. Also notified Dr sheriff of pt increase in BP. New orders placed.

## 2025-07-26 ENCOUNTER — APPOINTMENT (OUTPATIENT)
Dept: GENERAL RADIOLOGY | Age: 65
End: 2025-07-26
Payer: COMMERCIAL

## 2025-07-26 ENCOUNTER — APPOINTMENT (OUTPATIENT)
Dept: ULTRASOUND IMAGING | Age: 65
End: 2025-07-26
Payer: COMMERCIAL

## 2025-07-26 LAB
ALBUMIN SERPL-MCNC: 3.4 G/DL (ref 3.4–5)
ALBUMIN/GLOB SERPL: 1.2 {RATIO} (ref 1.1–2.2)
ALP SERPL-CCNC: 137 U/L (ref 40–129)
ALT SERPL-CCNC: 38 U/L (ref 10–40)
ANION GAP SERPL CALCULATED.3IONS-SCNC: 14 MMOL/L (ref 3–16)
AST SERPL-CCNC: 39 U/L (ref 15–37)
BASOPHILS # BLD: 0 K/UL (ref 0–0.2)
BASOPHILS NFR BLD: 0.4 %
BILIRUB SERPL-MCNC: 1.3 MG/DL (ref 0–1)
BUN SERPL-MCNC: 5 MG/DL (ref 7–20)
CALCIUM SERPL-MCNC: 8.4 MG/DL (ref 8.3–10.6)
CHLORIDE SERPL-SCNC: 101 MMOL/L (ref 99–110)
CO2 SERPL-SCNC: 23 MMOL/L (ref 21–32)
CREAT SERPL-MCNC: 0.6 MG/DL (ref 0.8–1.3)
DEPRECATED RDW RBC AUTO: 14.4 % (ref 12.4–15.4)
EOSINOPHIL # BLD: 0 K/UL (ref 0–0.6)
EOSINOPHIL NFR BLD: 0.2 %
GFR SERPLBLD CREATININE-BSD FMLA CKD-EPI: >90 ML/MIN/{1.73_M2}
GLUCOSE SERPL-MCNC: 126 MG/DL (ref 70–99)
HCT VFR BLD AUTO: 41.6 % (ref 40.5–52.5)
HGB BLD-MCNC: 13.6 G/DL (ref 13.5–17.5)
LYMPHOCYTES # BLD: 0.7 K/UL (ref 1–5.1)
LYMPHOCYTES NFR BLD: 10.6 %
MAGNESIUM SERPL-MCNC: 1.77 MG/DL (ref 1.8–2.4)
MCH RBC QN AUTO: 30.3 PG (ref 26–34)
MCHC RBC AUTO-ENTMCNC: 32.7 G/DL (ref 31–36)
MCV RBC AUTO: 92.5 FL (ref 80–100)
MONOCYTES # BLD: 0.7 K/UL (ref 0–1.3)
MONOCYTES NFR BLD: 11.5 %
NEUTROPHILS # BLD: 5 K/UL (ref 1.7–7.7)
NEUTROPHILS NFR BLD: 77.3 %
PHOSPHATE SERPL-MCNC: 3.1 MG/DL (ref 2.5–4.9)
PLATELET # BLD AUTO: 99 K/UL (ref 135–450)
PMV BLD AUTO: 9.2 FL (ref 5–10.5)
POTASSIUM SERPL-SCNC: 3.3 MMOL/L (ref 3.5–5.1)
PROT SERPL-MCNC: 6.2 G/DL (ref 6.4–8.2)
RBC # BLD AUTO: 4.49 M/UL (ref 4.2–5.9)
SODIUM SERPL-SCNC: 138 MMOL/L (ref 136–145)
WBC # BLD AUTO: 6.5 K/UL (ref 4–11)

## 2025-07-26 PROCEDURE — 6360000002 HC RX W HCPCS

## 2025-07-26 PROCEDURE — 2500000003 HC RX 250 WO HCPCS

## 2025-07-26 PROCEDURE — 76705 ECHO EXAM OF ABDOMEN: CPT

## 2025-07-26 PROCEDURE — 2060000000 HC ICU INTERMEDIATE R&B

## 2025-07-26 PROCEDURE — 6370000000 HC RX 637 (ALT 250 FOR IP)

## 2025-07-26 PROCEDURE — 6370000000 HC RX 637 (ALT 250 FOR IP): Performed by: INTERNAL MEDICINE

## 2025-07-26 PROCEDURE — 51701 INSERT BLADDER CATHETER: CPT

## 2025-07-26 PROCEDURE — 84100 ASSAY OF PHOSPHORUS: CPT

## 2025-07-26 PROCEDURE — 73060 X-RAY EXAM OF HUMERUS: CPT

## 2025-07-26 PROCEDURE — 80053 COMPREHEN METABOLIC PANEL: CPT

## 2025-07-26 PROCEDURE — 36415 COLL VENOUS BLD VENIPUNCTURE: CPT

## 2025-07-26 PROCEDURE — 6360000002 HC RX W HCPCS: Performed by: INTERNAL MEDICINE

## 2025-07-26 PROCEDURE — 83735 ASSAY OF MAGNESIUM: CPT

## 2025-07-26 PROCEDURE — 73100 X-RAY EXAM OF WRIST: CPT

## 2025-07-26 PROCEDURE — 2580000003 HC RX 258: Performed by: INTERNAL MEDICINE

## 2025-07-26 PROCEDURE — 99232 SBSQ HOSP IP/OBS MODERATE 35: CPT | Performed by: INTERNAL MEDICINE

## 2025-07-26 PROCEDURE — 51798 US URINE CAPACITY MEASURE: CPT

## 2025-07-26 PROCEDURE — 85025 COMPLETE CBC W/AUTO DIFF WBC: CPT

## 2025-07-26 PROCEDURE — 73070 X-RAY EXAM OF ELBOW: CPT

## 2025-07-26 RX ORDER — CHLORDIAZEPOXIDE HYDROCHLORIDE 10 MG/1
10 CAPSULE, GELATIN COATED ORAL 3 TIMES DAILY
Status: DISCONTINUED | OUTPATIENT
Start: 2025-07-26 | End: 2025-07-27

## 2025-07-26 RX ADMIN — SODIUM CHLORIDE, SODIUM LACTATE, POTASSIUM CHLORIDE, AND CALCIUM CHLORIDE: .6; .31; .03; .02 INJECTION, SOLUTION INTRAVENOUS at 21:30

## 2025-07-26 RX ADMIN — LOSARTAN POTASSIUM 100 MG: 100 TABLET, FILM COATED ORAL at 09:05

## 2025-07-26 RX ADMIN — MAGNESIUM SULFATE HEPTAHYDRATE 2000 MG: 40 INJECTION, SOLUTION INTRAVENOUS at 12:35

## 2025-07-26 RX ADMIN — LABETALOL HYDROCHLORIDE 10 MG: 5 INJECTION, SOLUTION INTRAVENOUS at 11:51

## 2025-07-26 RX ADMIN — SODIUM CHLORIDE, SODIUM LACTATE, POTASSIUM CHLORIDE, AND CALCIUM CHLORIDE: .6; .31; .03; .02 INJECTION, SOLUTION INTRAVENOUS at 11:35

## 2025-07-26 RX ADMIN — THERA TABS 1 TABLET: TAB at 09:05

## 2025-07-26 RX ADMIN — SODIUM CHLORIDE, PRESERVATIVE FREE 10 ML: 5 INJECTION INTRAVENOUS at 20:26

## 2025-07-26 RX ADMIN — CARVEDILOL 6.25 MG: 6.25 TABLET, FILM COATED ORAL at 09:06

## 2025-07-26 RX ADMIN — BUPROPION HYDROCHLORIDE 150 MG: 150 TABLET, EXTENDED RELEASE ORAL at 09:05

## 2025-07-26 RX ADMIN — Medication 100 MG: at 09:07

## 2025-07-26 RX ADMIN — POTASSIUM BICARBONATE 40 MEQ: 782 TABLET, EFFERVESCENT ORAL at 16:45

## 2025-07-26 RX ADMIN — ENOXAPARIN SODIUM 30 MG: 100 INJECTION SUBCUTANEOUS at 09:06

## 2025-07-26 RX ADMIN — CHLORDIAZEPOXIDE HYDROCHLORIDE 10 MG: 10 CAPSULE ORAL at 20:22

## 2025-07-26 RX ADMIN — AMLODIPINE BESYLATE 5 MG: 5 TABLET ORAL at 09:06

## 2025-07-26 RX ADMIN — AMITRIPTYLINE HYDROCHLORIDE 25 MG: 25 TABLET, COATED ORAL at 20:22

## 2025-07-26 RX ADMIN — SODIUM CHLORIDE, SODIUM LACTATE, POTASSIUM CHLORIDE, AND CALCIUM CHLORIDE: .6; .31; .03; .02 INJECTION, SOLUTION INTRAVENOUS at 00:03

## 2025-07-26 RX ADMIN — CHLORDIAZEPOXIDE HYDROCHLORIDE 10 MG: 10 CAPSULE ORAL at 09:07

## 2025-07-26 RX ADMIN — ASPIRIN 81 MG: 81 TABLET, COATED ORAL at 09:05

## 2025-07-26 RX ADMIN — CARVEDILOL 6.25 MG: 6.25 TABLET, FILM COATED ORAL at 16:45

## 2025-07-26 RX ADMIN — FOLIC ACID 1 MG: 1 TABLET ORAL at 09:05

## 2025-07-26 RX ADMIN — TAMSULOSIN HYDROCHLORIDE 0.4 MG: 0.4 CAPSULE ORAL at 20:22

## 2025-07-26 ASSESSMENT — PAIN SCALES - GENERAL: PAINLEVEL_OUTOF10: 8

## 2025-07-26 ASSESSMENT — PAIN DESCRIPTION - DESCRIPTORS: DESCRIPTORS: ACHING

## 2025-07-26 ASSESSMENT — PAIN DESCRIPTION - LOCATION: LOCATION: KNEE

## 2025-07-26 NOTE — PROGRESS NOTES
Ortho consult placed due to xray of L wrist results.  LEFT WRIST:     Possible acute nondisplaced avulsion fracture of the ulnar styloid.  No  additional fracture or dislocation is identified.  Mild osteoarthritis.

## 2025-07-26 NOTE — PROGRESS NOTES
Lap restraint removed.  Pt is awake and alert.   No behavior or agitation concerns.   Bed alarm on Telesitter in place.   Pt is near nurse station.

## 2025-07-26 NOTE — PROGRESS NOTES
Physical Therapy/Occupational Therapy    Orders received and chart reviewed. Pt sleeping soundly and unable to rouse with multiple attempts. Will continue to follow. Re-attempt as appropriate and schedule permits.    Mag Vera, PT, DPT  Sophia Nice, OTR/L #[id number]

## 2025-07-26 NOTE — PROGRESS NOTES
Transferred care to Sayra/JUDY Sánchez. Face to face bedside report given, no need voiced at this time. Pt in bed with eyes closed.  Pt easily responded when his name called and goes back to sleep again.   No signs of distress noted.  Patient  by the transporter for the US. Informed nurse sayra that the patient does not have any food or drink taken after midnight.

## 2025-07-26 NOTE — PROGRESS NOTES
Xrays ordered of L arm due to pt complaining of pain and swelling after a fall prior to admission.

## 2025-07-26 NOTE — PROGRESS NOTES
Visit Vitals  BP (!) 144/94   Pulse 97   Temp 98.2 °F (36.8 °C) (Axillary)   Resp 20   Ht 1.88 m (6' 2\")   Wt 102.1 kg (225 lb)   SpO2 94%   BMI 28.89 kg/m²    Patient is sleeping on bed but easily to wake up.  Still with Telemetry.  On room air.   Still with IV infusion regulated via alaris  No distress noted at this time.  Will continue to monitor patient.  Denies need at this time.  Call light and bedside table within reach. Bed at lowest position, locked, side rails x2, bed alarm on.

## 2025-07-26 NOTE — PROGRESS NOTES
Patient is sleeping and responded when his name called and go back to sleep again. Patient did not eat or drink anything since midnight.

## 2025-07-26 NOTE — PLAN OF CARE
Problem: Discharge Planning  Goal: Discharge to home or other facility with appropriate resources  7/25/2025 2258 by Cj Flores RN  Outcome: Progressing  Flowsheets (Taken 7/25/2025 2258)  Discharge to home or other facility with appropriate resources:   Identify barriers to discharge with patient and caregiver   Arrange for needed discharge resources and transportation as appropriate     Problem: Seizure Precautions  Goal: Remains free of injury related to seizures activity  7/25/2025 2258 by Cj Flores RN  Outcome: Progressing  Flowsheets (Taken 7/25/2025 2258)  Remains free of injury related to seizure activity:   Maintain airway, patient safety  and administer oxygen as ordered   Monitor patient for seizure activity, document and report duration and description of seizure to Licensed Independent Practitioner   If seizure occurs, turn patient to side and suction secretions as needed   Reorient patient post seizure     Problem: Safety - Adult  Goal: Free from fall injury  7/25/2025 2258 by Cj Flores RN  Outcome: Progressing  Flowsheets (Taken 7/25/2025 2258)  Free From Fall Injury: Instruct family/caregiver on patient safety     Problem: Skin/Tissue Integrity  Goal: Skin integrity remains intact  Description: 1.  Monitor for areas of redness and/or skin breakdown  2.  Assess vascular access sites hourly  3.  Every 4-6 hours minimum:  Change oxygen saturation probe site  4.  Every 4-6 hours:  If on nasal continuous positive airway pressure, respiratory therapy assess nares and determine need for appliance change or resting period  7/25/2025 2258 by Cj Flores RN  Outcome: Progressing  Flowsheets (Taken 7/25/2025 2258)  Skin Integrity Remains Intact:   Monitor for areas of redness and/or skin breakdown   Turn and reposition as indicated   Check visual cues for pain     Problem: Safety - Medical Restraint  Goal: Remains free of injury from restraints (Restraint for Interference with Medical

## 2025-07-26 NOTE — PROGRESS NOTES
[facility] Progress Note    Daily Progress Note for 2025 9:50 AM /0307-01  Franklin Mahmood : 1960 Age: 64 y.o. Sex: male  Length of Stay:  3    Interval History:      CC: F/U Alcohol Intoxication (Pt arrives via EMS with alcohol withdrawal, falls, and having delusions. EMS reports pt had 2 falls for them (no LOC -Blood thinners). Pt states he has been going to PT for his gait. Pt drinks a bottle of whiskey every 2-3 days. Last drink @ 1700 yesterday )    Subjective:       Feels much better, more oriented and answering more appropriately. Electrolytes improving. Seems a bit tremulous though.      Objective:     Vitals:    25 0008 25 0425 25 0814 25 0845   BP: (!) 157/94 (!) 144/94 (!) 165/102 135/74   Pulse: 93 97 97 97   Resp: 20  20    Temp: 98.5 °F (36.9 °C) 98.2 °F (36.8 °C)     TempSrc: Axillary Axillary     SpO2: 93% 94% 94%    Weight:       Height:              Intake/Output Summary (Last 24 hours) at 2025 0950  Last data filed at 2025 0848  Gross per 24 hour   Intake 3365.14 ml   Output 2200 ml   Net 1165.14 ml     Body mass index is 28.89 kg/m².    Physical Exam:  General: Cooperative, pleasant/Ill appearing, on  Nasal cannula  HEENT:  Head: normocephalic,atraumatic, anicteric sclera, clear conjunctiva  Neck: Normal size, Jugular venous pulsations: normal  Respiratory:unlabored breathing, clear to auscultation with no crackles, wheezes rhonchi  Heart: Regular rate and rhythm, S1, S2-normal, No murmurs  Abdomen: soft, nondistended, nontender, normoactive bowel sounds,  Neurological/Psych: Alert and oriented times three, no focal neurological deficits, Mood and affect appropriate.  Skin: No obvious rashes    Extremities:  no edema, Pedal pulses 2+ bilaterally    Scheduled Medications:  chlordiazePOXIDE, 10 mg, TID  thiamine, 100 mg, Daily  sodium chloride flush, 5-40 mL, 2 times per day  multivitamin, 1 tablet, Daily  folic acid, 1 mg,  reviewed:     US GALLBLADDER RUQ   Final Result   1. Diffuse hepatic steatosis.   2. Otherwise, unremarkable right upper quadrant ultrasound.         MRI BRAIN WO CONTRAST   Final Result   1. No acute infarct. Mild generalized parenchymal atrophy and mild white matter signal abnormality, suggestive of chronic small vessel disease.            CT Head W/O Contrast   Final Result   1. No acute intracranial abnormality.   2. No acute osseous abnormality of the cervical spine.         CT C-Spine W/O Contrast   Final Result   1. No acute intracranial abnormality.   2. No acute osseous abnormality of the cervical spine.             Lab Results   Component Value Date    LABA1C 5.1 07/24/2025      Body mass index is 28.89 kg/m².       Impression/Plan:      Alcohol abuse with acute withdrawal.  -reports drinking a bottle of whiskey q2-3 days, last drink 1500 on 7/22.  -EtOH level: undetectable.  -fall and seizure precautions.  -SARAY Librium.   -CIWA protocol with PRN Ativan/Valium.  -start multivitamin, folate, and thiamine.  -IVF.  -CM for resources.      Concern for Wernicke's encephalopathy.  -confusion, speech changes, dizziness, gait abnormality/falls.  -reportedly going to PT outpatient for gait.  -CT head & C-spine - nonacute.  -fall precautions.  -PT/OT.  -MRI ordered - No acute infarct. Mild generalized parenchymal atrophy and mild white matter signal abnormality, suggestive of chronic small vessel disease   -neurology consulted.  -initiated high-dose IV Thiamine.   -ammonia neg     AGMA.  -AG 20, bicarb 19.  -likely 2/2 above.  -IVF.  -monitor.     Hypokalemia. - severe  Hypomagnesemia.  -K 3.0, mag 1.04.  -replacement ordered.  -continue to monitor and replete PRN.  - repeat labs in pm     Transaminitis.  -alk phos 165, ALT 63, AST 77, total bili 1.5.  -likely in the setting of alcohol abuse.  -monitor daily LFTs.  - hepatitis panel neg - neg  - RUQ US - pending  - INR normal  - improving      Pancytopenia.  -WBC

## 2025-07-26 NOTE — PROGRESS NOTES
Inpatient Occupational Therapy Evaluation & Treatment    Unit: PCU  Date:  7/27/2025  Patient Name:    Franklin Mahmood  Admitting diagnosis:  Alcoholic ketoacidosis [E87.29]  Hypomagnesemia [E83.42]  Wernicke's encephalopathy [E51.2]  Alcohol withdrawal syndrome without complication (HCC) [F10.930]  Admit Date:  7/23/2025  Precautions/Restrictions/WB Status/ Lines/ Wounds/ Oxygen: Fall risk, Bed/chair alarm, Lines (IV and external catheter), No BP/sticks left, and Telesitter Recent L wrist fx - awaiting ortho recs, maintained NWB during session    Pt seen for cotreatment this date due to patient safety, patient endurance, complexity of condition, acute illness/injury, and need for the assistance of 2 skilled therapists    Treatment Time:  0912-0947  Treatment Number:  1  Timed Code Treatment Minutes: 25 minutes  Total Treatment Minutes:  35  minutes    Patient Goals for Therapy: none stated - pt agreeable to therapy          Discharge Recommendations: SNF  DME needs for discharge: Defer to facility       Therapy recommendations for staff:   Assist of 1 for sitting EOB    History of Present Illness:   Per H&P of Elena Kim PA-C 7/23/25  Chief complaint: Alcohol intoxication, + falls, + delusions  Pt reports going to PT for his gait  65 y/o m \"...PMHx of alcohol abuse, hypertension, mood disorder, BPH [facility] and falling at home.  His wife is in the room helping to provide part of the history. This afternoon, he stood up and became extremely lightheaded and collapsed to the ground. It happened again a couple moments later. He denies losing consciousness or hitting his head when lowering himself to the ground. He does have a history of alcohol abuse and withdrawal. He typically drinks about 6 shots of bourbon per day. His last drink was yesterday evening around 4 pm when he had a maxine. His wife also states that he believed people were in their house when in actuality

## 2025-07-26 NOTE — PLAN OF CARE
Problem: Discharge Planning  Goal: Discharge to home or other facility with appropriate resources  Outcome: Progressing     Problem: Seizure Precautions  Goal: Remains free of injury related to seizures activity  Outcome: Progressing     Problem: Safety - Adult  Goal: Free from fall injury  Outcome: Progressing     Problem: Skin/Tissue Integrity  Goal: Skin integrity remains intact  Description: 1.  Monitor for areas of redness and/or skin breakdown  2.  Assess vascular access sites hourly  3.  Every 4-6 hours minimum:  Change oxygen saturation probe site  4.  Every 4-6 hours:  If on nasal continuous positive airway pressure, respiratory therapy assess nares and determine need for appliance change or resting period  Outcome: Progressing     Problem: Safety - Medical Restraint  Goal: Remains free of injury from restraints (Restraint for Interference with Medical Device)  Description: INTERVENTIONS:  1. Determine that other, less restrictive measures have been tried or would not be effective before applying the restraint  2. Evaluate the patient's condition at the time of restraint application  3. Inform patient/family regarding the reason for restraint  4. Q2H: Monitor safety, psychosocial status, comfort, nutrition and hydration  Outcome: Progressing--- Pt came off [place] Belt today     Problem: Pain  Goal: Verbalizes/displays adequate comfort level or baseline comfort level  Outcome: Progressing

## 2025-07-26 NOTE — FLOWSHEET NOTE
07/25/25 2000   Vital Signs   Temp 98.2 °F (36.8 °C)   Temp Source Oral   Pulse 97   Heart Rate Source Monitor   Respirations 20   BP (!) 154/98   MAP (Calculated) 117   BP Location Left upper arm   BP Method Automatic   Patient Position Semi fowlers   Pain Assessment   Pain Assessment None - Denies Pain   Oxygen Therapy   SpO2 95 %   O2 Device None (Room air)     Patient alert and oriented, cooperative resting in bed, watching tv. With telemetry. On room air. With posey belt restraints. With an ongoing IV infusion regulated via alaris. Assessment completed. Respiration easy, even and unlabored. Electrolytes replacement given as ordered. Call light and bedside table within reach. Bed at lowest position, locked, side rails x2, bed alarm on. Pt denies needs at this time.

## 2025-07-27 PROBLEM — R44.3 HALLUCINATIONS: Status: ACTIVE | Noted: 2025-07-27

## 2025-07-27 LAB
ANION GAP SERPL CALCULATED.3IONS-SCNC: 14 MMOL/L (ref 3–16)
BUN SERPL-MCNC: 6 MG/DL (ref 7–20)
CALCIUM SERPL-MCNC: 9.2 MG/DL (ref 8.3–10.6)
CHLORIDE SERPL-SCNC: 103 MMOL/L (ref 99–110)
CO2 SERPL-SCNC: 19 MMOL/L (ref 21–32)
CREAT SERPL-MCNC: 0.7 MG/DL (ref 0.8–1.3)
GFR SERPLBLD CREATININE-BSD FMLA CKD-EPI: >90 ML/MIN/{1.73_M2}
GLUCOSE SERPL-MCNC: 103 MG/DL (ref 70–99)
MAGNESIUM SERPL-MCNC: 2.22 MG/DL (ref 1.8–2.4)
POTASSIUM SERPL-SCNC: 3.7 MMOL/L (ref 3.5–5.1)
SODIUM SERPL-SCNC: 136 MMOL/L (ref 136–145)

## 2025-07-27 PROCEDURE — 97166 OT EVAL MOD COMPLEX 45 MIN: CPT

## 2025-07-27 PROCEDURE — 97530 THERAPEUTIC ACTIVITIES: CPT

## 2025-07-27 PROCEDURE — 99223 1ST HOSP IP/OBS HIGH 75: CPT

## 2025-07-27 PROCEDURE — 97535 SELF CARE MNGMENT TRAINING: CPT

## 2025-07-27 PROCEDURE — 99232 SBSQ HOSP IP/OBS MODERATE 35: CPT | Performed by: INTERNAL MEDICINE

## 2025-07-27 PROCEDURE — 2500000003 HC RX 250 WO HCPCS

## 2025-07-27 PROCEDURE — 6370000000 HC RX 637 (ALT 250 FOR IP)

## 2025-07-27 PROCEDURE — 36415 COLL VENOUS BLD VENIPUNCTURE: CPT

## 2025-07-27 PROCEDURE — 80048 BASIC METABOLIC PNL TOTAL CA: CPT

## 2025-07-27 PROCEDURE — 2060000000 HC ICU INTERMEDIATE R&B

## 2025-07-27 PROCEDURE — 6360000002 HC RX W HCPCS

## 2025-07-27 PROCEDURE — 2580000003 HC RX 258: Performed by: INTERNAL MEDICINE

## 2025-07-27 PROCEDURE — 97162 PT EVAL MOD COMPLEX 30 MIN: CPT

## 2025-07-27 PROCEDURE — 83735 ASSAY OF MAGNESIUM: CPT

## 2025-07-27 RX ADMIN — CARVEDILOL 6.25 MG: 6.25 TABLET, FILM COATED ORAL at 16:18

## 2025-07-27 RX ADMIN — SODIUM CHLORIDE, SODIUM LACTATE, POTASSIUM CHLORIDE, AND CALCIUM CHLORIDE: .6; .31; .03; .02 INJECTION, SOLUTION INTRAVENOUS at 01:03

## 2025-07-27 RX ADMIN — SODIUM CHLORIDE, SODIUM LACTATE, POTASSIUM CHLORIDE, AND CALCIUM CHLORIDE: .6; .31; .03; .02 INJECTION, SOLUTION INTRAVENOUS at 21:19

## 2025-07-27 RX ADMIN — AMITRIPTYLINE HYDROCHLORIDE 25 MG: 25 TABLET, COATED ORAL at 21:55

## 2025-07-27 RX ADMIN — ENOXAPARIN SODIUM 30 MG: 100 INJECTION SUBCUTANEOUS at 21:57

## 2025-07-27 RX ADMIN — ASPIRIN 81 MG: 81 TABLET, COATED ORAL at 08:31

## 2025-07-27 RX ADMIN — SODIUM CHLORIDE, PRESERVATIVE FREE 10 ML: 5 INJECTION INTRAVENOUS at 08:34

## 2025-07-27 RX ADMIN — SODIUM CHLORIDE, PRESERVATIVE FREE 10 ML: 5 INJECTION INTRAVENOUS at 21:54

## 2025-07-27 RX ADMIN — AMLODIPINE BESYLATE 5 MG: 5 TABLET ORAL at 08:33

## 2025-07-27 RX ADMIN — CARVEDILOL 6.25 MG: 6.25 TABLET, FILM COATED ORAL at 08:31

## 2025-07-27 RX ADMIN — LOSARTAN POTASSIUM 100 MG: 100 TABLET, FILM COATED ORAL at 08:31

## 2025-07-27 RX ADMIN — THERA TABS 1 TABLET: TAB at 08:31

## 2025-07-27 RX ADMIN — SODIUM CHLORIDE, SODIUM LACTATE, POTASSIUM CHLORIDE, AND CALCIUM CHLORIDE: .6; .31; .03; .02 INJECTION, SOLUTION INTRAVENOUS at 11:08

## 2025-07-27 RX ADMIN — BUPROPION HYDROCHLORIDE 150 MG: 150 TABLET, EXTENDED RELEASE ORAL at 08:31

## 2025-07-27 RX ADMIN — TAMSULOSIN HYDROCHLORIDE 0.4 MG: 0.4 CAPSULE ORAL at 21:55

## 2025-07-27 RX ADMIN — Medication 100 MG: at 08:31

## 2025-07-27 RX ADMIN — FOLIC ACID 1 MG: 1 TABLET ORAL at 08:31

## 2025-07-27 ASSESSMENT — PAIN SCALES - GENERAL: PAINLEVEL_OUTOF10: 8

## 2025-07-27 ASSESSMENT — PAIN DESCRIPTION - LOCATION: LOCATION: WRIST

## 2025-07-27 NOTE — PROGRESS NOTES
were in their house when in actuality no one was there. She also states that he believed people were playing ping pong in the ED prior to my exam. He denies a history of seizure from withdrawal. He has a history of gait instability for which he has been going to balance therapy for the past couple of months. He states that this has been an ongoing issue for years. His prior pcp believed the lightheadedness and falling could be due to orthostatic hypotension.  He denies chest pain, loss of consciousness, nausea, vomiting, and diarrhea.   History obtained from the patient and review of EMR. \"     Preadmission Environment:   Pt. Lives                                              Spouse (around 24/7, in fair health)   Home environment:                            condo  Steps to enter first floor:                     one steps to enter with handrail  Steps to second floor/basement:        Full flight of 12-13 and hand rail: unilateral  Laundry:                                              Basement  Bathroom:                                           tub/shower unit and standard height toilet  Pt sleeps in a:                                     Flat bed  Equipment owned:                              RW, SPC, and CPAP (Pt borrows RW from neighbor)     Preadmission Status:  Pt. Able to drive:                                 Yes  Pt. Fully independent with ADLs:       Yes  Pt. Required assistance for:               Independent PTA  Pt. independent for functional transfers and utilized SPC (intermittently) for mobility in home and No Device out in community  History of falls:                                                Yes; \"I get dizzy sometimes\" one fall a week for about a year  Home Health Services:                       RN 1x/wk and PT 2x/wk- pt stated also was doing outpatient therapy 2x/wk    AM-PAC Mobility Score       AM-PAC Inpatient Mobility without Stair Climbing Raw Score : 7    Subjective  Patient lying  reclined in bed with no family present.  Pt agreeable to this PT session. RN cleared pt for PT.    Cognition    A&O x4   Able to follow 1 step commands    Pain   Yes  Location: L wrist  Ratin /10  Pain Medicine Status: No request made    Activity Tolerance   During therapy session noted pt with no adverse symptoms      BP (mmHg) HR (bpm) SpO2 (%) on  Comments    Supine at rest 143/104 94      Seated at /95 94       Standing           End of session             Objective  Does this pt have an acute or acute on chronic diagnosis of CHF? No    Upper Extremity ROM/Strength  Please see OT evaluation.      Lower Extremity ROM / Strength   AROM WFL: Yes  ROM limitations:     Strength Assessment (measured on a 0-5 scale):  R LE   Quad   3+   Ant Tib  3+   Hamstring 3+   Iliopsoas 3+  L LE  Quad   3+   Ant Tib  3+   Hamstring 3+   Iliopsoas 3+    Lower Extremity Sensation    NT    Coordination  NT    Balance  Static Sitting:  SBA-modA  Dynamic Sitting:  SBA- modA  Comments: pt sat EOB for 10 mins required intermittent mod A due to posterior lean    Static Standing: Not Tested  Dynamic Standing:  Not Tested  Comments:     Posture  Seated: Forward head and neck and Posterior lean  Standing: Not Tested    Bed Mobility   Supine to Sit:    Max A , HOB elevated , With increased time, and With cues for hand placement  Sit to Supine:   Max A , 2 person, HOB elevated , With increased time, and With use of bedrail  Rolling:   Not Tested  Scooting in sitting: SBA-maxA pt was intermittently able to scoot himself then would stop progression and required maxA to continue with scooting  Scooting in supine:  2 person, HOB flat, and HOB elevated    Bridging:  Not Tested  Comments:     Transfer Training     Sit to stand:   Not Tested  Stand to sit:   Not Tested  Bed to/from Chair:  Not Tested   Comments:     Gait gait deferred due to difficulty with transfers; pt ambulated 0 ft.     Stair Training deferred, pt unsafe/ not

## 2025-07-27 NOTE — PLAN OF CARE
Problem: Discharge Planning  Goal: Discharge to home or other facility with appropriate resources  7/26/2025 2208 by Cj Flores RN  Outcome: Progressing  Flowsheets (Taken 7/26/2025 2208)  Discharge to home or other facility with appropriate resources:   Identify barriers to discharge with patient and caregiver   Arrange for needed discharge resources and transportation as appropriate     Problem: Seizure Precautions  Goal: Remains free of injury related to seizures activity  7/26/2025 2208 by Cj Flores RN  Outcome: Progressing  Flowsheets (Taken 7/26/2025 2208)  Remains free of injury related to seizure activity:   Maintain airway, patient safety  and administer oxygen as ordered   Monitor patient for seizure activity, document and report duration and description of seizure to Licensed Independent Practitioner   If seizure occurs, turn patient to side and suction secretions as needed   Reorient patient post seizure     Problem: Safety - Adult  Goal: Free from fall injury  7/26/2025 2208 by Cj Flores RN  Outcome: Progressing  Flowsheets (Taken 7/26/2025 2208)  Free From Fall Injury: Instruct family/caregiver on patient safety  7/26/2025 1619 by Diamante Robles RN  Outcome: Progressing     Problem: Skin/Tissue Integrity  Goal: Skin integrity remains intact  Description: 1.  Monitor for areas of redness and/or skin breakdown  2.  Assess vascular access sites hourly  3.  Every 4-6 hours minimum:  Change oxygen saturation probe site  4.  Every 4-6 hours:  If on nasal continuous positive airway pressure, respiratory therapy assess nares and determine need for appliance change or resting period  7/26/2025 2208 by Cj Flores RN  Outcome: Progressing  Flowsheets (Taken 7/26/2025 2208)  Skin Integrity Remains Intact:   Monitor for areas of redness and/or skin breakdown   Check visual cues for pain   Turn and reposition as indicated     Problem: Pain  Goal: Verbalizes/displays adequate comfort level or baseline

## 2025-07-27 NOTE — PLAN OF CARE
Problem: Discharge Planning  Goal: Discharge to home or other facility with appropriate resources  Outcome: Progressing     Problem: Seizure Precautions  Goal: Remains free of injury related to seizures activity  Outcome: Progressing     Problem: Safety - Adult  Goal: Free from fall injury  Outcome: Progressing     Problem: Skin/Tissue Integrity  Goal: Skin integrity remains intact  Description: 1.  Monitor for areas of redness and/or skin breakdown  2.  Assess vascular access sites hourly  3.  Every 4-6 hours minimum:  Change oxygen saturation probe site  4.  Every 4-6 hours:  If on nasal continuous positive airway pressure, respiratory therapy assess nares and determine need for appliance change or resting period  Outcome: Progressing     Problem: Safety - Medical Restraint  Goal: Remains free of injury from restraints (Restraint for Interference with Medical Device)  Description: INTERVENTIONS:  1. Determine that other, less restrictive measures have been tried or would not be effective before applying the restraint  2. Evaluate the patient's condition at the time of restraint application  3. Inform patient/family regarding the reason for restraint  4. Q2H: Monitor safety, psychosocial status, comfort, nutrition and hydration  Outcome: Progressing     Problem: Pain  Goal: Verbalizes/displays adequate comfort level or baseline comfort level  Outcome: Progressing- Pt was placed in a L wrist splint today     Problem: Genitourinary - Adult  Goal: Absence of urinary retention  Outcome: Progressing

## 2025-07-27 NOTE — PROGRESS NOTES
Pt is asleep comfortably in bed.    Arouses to voice.   Good urinary output today.    Pts wife was here to visit today.   He has had longer periods of being awake and talkative today.

## 2025-07-27 NOTE — PROGRESS NOTES
12/11/2024 03:46 PM    INR 1.01 09/25/2014 05:57 AM       Test Review:        Radiology reviewed:     XR ELBOW LEFT (2 VIEWS)   Final Result   LEFT HUMERUS:      No acute abnormality of the left humerus.      LEFT ELBOW:      Study limited as a lateral view of the left elbow was not provided for   review.  There is no definite gross acute fracture or dislocation of the left   elbow.  Recommend obtaining a true lateral view to complete evaluation of the   left elbow.      LEFT WRIST:      Possible acute nondisplaced avulsion fracture of the ulnar styloid.  No   additional fracture or dislocation is identified.  Mild osteoarthritis.         XR HUMERUS LEFT (MIN 2 VIEWS)   Final Result   LEFT HUMERUS:      No acute abnormality of the left humerus.      LEFT ELBOW:      Study limited as a lateral view of the left elbow was not provided for   review.  There is no definite gross acute fracture or dislocation of the left   elbow.  Recommend obtaining a true lateral view to complete evaluation of the   left elbow.      LEFT WRIST:      Possible acute nondisplaced avulsion fracture of the ulnar styloid.  No   additional fracture or dislocation is identified.  Mild osteoarthritis.         XR WRIST LEFT (2 VIEWS)   Final Result   LEFT HUMERUS:      No acute abnormality of the left humerus.      LEFT ELBOW:      Study limited as a lateral view of the left elbow was not provided for   review.  There is no definite gross acute fracture or dislocation of the left   elbow.  Recommend obtaining a true lateral view to complete evaluation of the   left elbow.      LEFT WRIST:      Possible acute nondisplaced avulsion fracture of the ulnar styloid.  No   additional fracture or dislocation is identified.  Mild osteoarthritis.         US GALLBLADDER RUQ   Final Result   1. Diffuse hepatic steatosis.   2. Otherwise, unremarkable right upper quadrant ultrasound.         MRI BRAIN WO CONTRAST   Final Result   1. No acute infarct. Mild  improving      Pancytopenia.  -WBC 2.5, hemoglobin 13.3, platelet count 103.  -likely due to alcoholic liver disease.  -monitor daily CBC.     Hypertension.  -continue home amlodipine, Coreg, olmesartan (substitute for losartan while inpatient).      Mood disorder.  -continue home amitriptyline and bupropion     BPH.  -continue Flomax.     Note above makes patient higher risk for morbidity and mortality requiring testing and treatment.     DVT Prophylaxis: lovenox    Management discussed with RN,     Improving but needs PT/OT, may need SNF    Electronically signed by: Erika Joy DO, 7/27/2025 11:06 AM

## 2025-07-27 NOTE — CONSULTS
Psychiatric Consult     Admit Date:  7/23/2025    Consult Date:  7/27/2025     Reason for Consult: Delusions/Hallucinations    Summary Present Illness: The patient is a 64 y.o. male with a PMHx of alcohol abuse, hypertension, mood disorder, BPH who presents to [facility] with gait instability and falling at home.  His wife is in the room helping to provide part of the history. This afternoon, he stood up and became extremely lightheaded and collapsed to the ground. It happened again a couple moments later. He denies losing consciousness or hitting his head when lowering himself to the ground. He does have a history of alcohol abuse and withdrawal. He typically drinks about 6 shots of bourbon per day. His last drink was yesterday evening around 4 pm when he had a maxine. His wife also states that he believed people were in their house when in actuality no one was there. She also states that he believed people were playing ping pong in the ED prior to my exam. He denies a history of seizure from withdrawal. He has a history of gait instability for which he has been going to balance therapy for the past couple of months. He states that this has been an ongoing issue for years. His prior pcp believed the lightheadedness and falling could be due to orthostatic hypotension.     Pt consulted on PCU, sitting up in bed, alert and oriented.  Pt able to tell me why he came to the ED, discussing his falls and recent weakness.  Pt asked about his drinking, he minimized how much he had been drinking with me, stating maybe 2-3 times per week.  Pt admits to going through withdrawal symptoms, describing his tremors, stating he was seeing strange things in the rooms of his home.  Pt states he would see people and animals, able to state that they were not real, most likely related to alcohol.  Pt denies that he is experiencing these things now.  He answered questions appropriately, discussed low moods and motivation, has denied  be intact     ROS:  Reviewed ED and Med notes and agree with findings  Labs:    No results displayed because visit has over 200 results.             Impression: Alcohol Withdrawal Syndrome  Dx: axis I: Alcohol withdrawal syndrome without complication (HCC)   Axis 2: No diagnosis  Jerome 3: See Medical History  Axis 4: Other psychosocial and environmental problems  Axis 5: 51-60 moderate symptoms         Active Hospital Problems    Diagnosis Date Noted    Hypokalemia [E87.6] 07/24/2025    Alcohol withdrawal syndrome without complication (HCC) [F10.930] 07/23/2025    Wernicke's encephalopathy [E51.2] 07/23/2025    Alcoholic ketoacidosis [E87.29] 07/23/2025    Hypomagnesemia [E83.42] 07/23/2025    Hypertension [I10] 07/23/2025       Recommendation: Pt will not require inpatient admission.  Recommend CIWA, thiamine replacement as outlined by neurology, seizure precautions.  OK to continue Amitriptyline, pt encouraged to use caution with Wellbutrin, especially when drinking.  Pt can be discharged when medically stable, will not require inpatient psychiatric admission.  Pt would benefit from outpatient mental health resources for medication management.      Spent > 75 minutes evaluating and treating patient     LUCINDA Ferreira-BC

## 2025-07-27 NOTE — DISCHARGE INSTRUCTIONS
Wear wrist brace until follow up.  F/U with Dr Sher Bartlett in 2 weeks.  Call [facility] for appointment time and date for follow up at [phone].

## 2025-07-27 NOTE — CONSULTS
Department of Orthopedic Surgery  Physician Assistant   Consult Note        Reason for Consult:  left wrist pain  Requesting Physician: Madhuri Velasco,*  Date of Service: 7/27/2025 11:03 AM    CHIEF COMPLAINT:  As Above    History Obtained From:  patient, electronic medical record    HISTORY OF PRESENT ILLNESS:                The patient is a 64 y.o. male who presents with above chief complaint.  Pt with history of ETOH abuse and withdrawal who has been admitted here at [facility] for a few days.  Complained of wrist and left arm pain yesterday and plain films were obtained.  He apparently has been having frequent falls at home recently.  Does not remember any specific incident leading to the left arm pain.  Denies n/t in the arm.  He is right handed.      Past Medical History:        Diagnosis Date    Acid reflux     Alcohol abuse     Hyperlipidemia     Hypertension     Vertigo      Past Surgical History:        Procedure Laterality Date    COLONOSCOPY  10/06/2015    divert    HERNIA REPAIR      PRE-MALIGNANT / BENIGN SKIN LESION EXCISION      Lump removed from under left arm    WISDOM TOOTH EXTRACTION      2 extracted and still has 2         Medications Prior to Admission:   Prior to Admission medications    Medication Sig Start Date End Date Taking? Authorizing Provider   olmesartan-hydroCHLOROthiazide (BENICAR HCT) 40-25 MG per tablet Take 1 tablet by mouth daily    ProviderWilliam MD   naltrexone (DEPADE) 50 MG tablet Take 2 tablets by mouth daily    William Oleary MD   tadalafil (CIALIS) 20 MG tablet Take 1 tablet by mouth as needed for Erectile Dysfunction    ProviderWilliam MD   ibuprofen (ADVIL;MOTRIN) 800 MG tablet Take 1 tablet by mouth every 8 hours as needed for Pain    ProviderWilliam MD   vitamin D (ERGOCALCIFEROL) 1.25 MG (22186 UT) CAPS capsule Take 1 capsule by mouth once a week    ProviderWilliam MD   cloNIDine (CATAPRES) 0.2 MG tablet Take 1 tablet by mouth  limited as a lateral view of the left elbow was not provided for   review.  There is no definite gross acute fracture or dislocation of the left   elbow.  Recommend obtaining a true lateral view to complete evaluation of the   left elbow.      LEFT WRIST:      Possible acute nondisplaced avulsion fracture of the ulnar styloid.  No   additional fracture or dislocation is identified.  Mild osteoarthritis.         XR WRIST LEFT (2 VIEWS)   Final Result   LEFT HUMERUS:      No acute abnormality of the left humerus.      LEFT ELBOW:      Study limited as a lateral view of the left elbow was not provided for   review.  There is no definite gross acute fracture or dislocation of the left   elbow.  Recommend obtaining a true lateral view to complete evaluation of the   left elbow.      LEFT WRIST:      Possible acute nondisplaced avulsion fracture of the ulnar styloid.  No   additional fracture or dislocation is identified.  Mild osteoarthritis.         US GALLBLADDER RUQ   Final Result   1. Diffuse hepatic steatosis.   2. Otherwise, unremarkable right upper quadrant ultrasound.         MRI BRAIN WO CONTRAST   Final Result   1. No acute infarct. Mild generalized parenchymal atrophy and mild white matter signal abnormality, suggestive of chronic small vessel disease.            CT Head W/O Contrast   Final Result   1. No acute intracranial abnormality.   2. No acute osseous abnormality of the cervical spine.         CT C-Spine W/O Contrast   Final Result   1. No acute intracranial abnormality.   2. No acute osseous abnormality of the cervical spine.                IMPRESSION/RECOMMENDATIONS:    Assessment: Left ulnar styloid fracture    Plan:  1) No plans for surgical intervention.  Will order a velcro wrist splint he can wear on the left arm for comfort and support.  Okay for ROM at the wrist and elbow.  Would recommend platform walker if necessary to avoid WB on the wrist for now.  Will plan to see him back in the office

## 2025-07-27 NOTE — PROGRESS NOTES
Shift report received from Randy   Pt is asleep and resting comfortably at this time.   Video monitoring still present in room.   Will continue to monitor

## 2025-07-27 NOTE — FLOWSHEET NOTE
07/26/25 2019   CIWA-Ar   BP (!) 150/97   Pulse 86   Nausea and Vomiting 0   Tactile Disturbances 0   Tremor 3   Auditory Disturbances 0   Paroxysmal Sweats 0   Visual Disturbances 0   Anxiety 0   Headache, Fullness in Head 0   Agitation 0   Orientation and Clouding of Sensorium 0   CIWA-Ar Total 3     BP (!) 150/97   Pulse 86   Temp 98.4 °F (36.9 °C) (Oral)   Resp 20   Ht 1.88 m (6' 2\")   Wt 102.1 kg (225 lb)   SpO2 96%   BMI 28.89 kg/m²     Patient alert and oriented resting in bed, watching tv. Able to follow commands. Conversable, able to state his name, birth date where he is and the reason why he is here. No agressive behavior noted.  With telemetry. On room air. With noticeable swelling on his left extremity. Changed IV infusion to the right forearm- regulated via alaris. With malewick connected to wall suction- with output, maria g clear. Assessment completed. Respiration easy, even and unlabored. Patient tolerated night meds well. Call light and bedside table within reach. Bed at lowest position, locked, side rails x2, bed alarm on. Needs attended.

## 2025-07-28 PROCEDURE — 2060000000 HC ICU INTERMEDIATE R&B

## 2025-07-28 PROCEDURE — 2580000003 HC RX 258: Performed by: INTERNAL MEDICINE

## 2025-07-28 PROCEDURE — 6370000000 HC RX 637 (ALT 250 FOR IP)

## 2025-07-28 PROCEDURE — 2500000003 HC RX 250 WO HCPCS

## 2025-07-28 PROCEDURE — 99232 SBSQ HOSP IP/OBS MODERATE 35: CPT | Performed by: INTERNAL MEDICINE

## 2025-07-28 PROCEDURE — 6360000002 HC RX W HCPCS

## 2025-07-28 PROCEDURE — 6370000000 HC RX 637 (ALT 250 FOR IP): Performed by: INTERNAL MEDICINE

## 2025-07-28 RX ORDER — CHLORDIAZEPOXIDE HYDROCHLORIDE 5 MG/1
5 CAPSULE, GELATIN COATED ORAL 3 TIMES DAILY
Status: DISCONTINUED | OUTPATIENT
Start: 2025-07-28 | End: 2025-07-31

## 2025-07-28 RX ORDER — CLONIDINE HYDROCHLORIDE 0.1 MG/1
0.1 TABLET ORAL 2 TIMES DAILY
Status: DISCONTINUED | OUTPATIENT
Start: 2025-07-28 | End: 2025-08-02 | Stop reason: HOSPADM

## 2025-07-28 RX ADMIN — THERA TABS 1 TABLET: TAB at 09:48

## 2025-07-28 RX ADMIN — BUPROPION HYDROCHLORIDE 150 MG: 150 TABLET, EXTENDED RELEASE ORAL at 09:48

## 2025-07-28 RX ADMIN — CHLORDIAZEPOXIDE HYDROCHLORIDE 5 MG: 5 CAPSULE ORAL at 20:15

## 2025-07-28 RX ADMIN — SODIUM CHLORIDE, SODIUM LACTATE, POTASSIUM CHLORIDE, AND CALCIUM CHLORIDE: .6; .31; .03; .02 INJECTION, SOLUTION INTRAVENOUS at 07:30

## 2025-07-28 RX ADMIN — ENOXAPARIN SODIUM 30 MG: 100 INJECTION SUBCUTANEOUS at 20:15

## 2025-07-28 RX ADMIN — SODIUM CHLORIDE, PRESERVATIVE FREE 10 ML: 5 INJECTION INTRAVENOUS at 09:45

## 2025-07-28 RX ADMIN — ASPIRIN 81 MG: 81 TABLET, COATED ORAL at 09:48

## 2025-07-28 RX ADMIN — ENOXAPARIN SODIUM 30 MG: 100 INJECTION SUBCUTANEOUS at 09:48

## 2025-07-28 RX ADMIN — CHLORDIAZEPOXIDE HYDROCHLORIDE 5 MG: 5 CAPSULE ORAL at 14:44

## 2025-07-28 RX ADMIN — CARVEDILOL 6.25 MG: 6.25 TABLET, FILM COATED ORAL at 18:06

## 2025-07-28 RX ADMIN — AMLODIPINE BESYLATE 5 MG: 5 TABLET ORAL at 09:48

## 2025-07-28 RX ADMIN — FOLIC ACID 1 MG: 1 TABLET ORAL at 09:48

## 2025-07-28 RX ADMIN — CLONIDINE HYDROCHLORIDE 0.1 MG: 0.1 TABLET ORAL at 12:15

## 2025-07-28 RX ADMIN — TAMSULOSIN HYDROCHLORIDE 0.4 MG: 0.4 CAPSULE ORAL at 20:15

## 2025-07-28 RX ADMIN — CLONIDINE HYDROCHLORIDE 0.1 MG: 0.1 TABLET ORAL at 20:15

## 2025-07-28 RX ADMIN — SODIUM CHLORIDE, PRESERVATIVE FREE 10 ML: 5 INJECTION INTRAVENOUS at 20:53

## 2025-07-28 RX ADMIN — LOSARTAN POTASSIUM 100 MG: 100 TABLET, FILM COATED ORAL at 09:48

## 2025-07-28 RX ADMIN — CARVEDILOL 6.25 MG: 6.25 TABLET, FILM COATED ORAL at 09:48

## 2025-07-28 RX ADMIN — Medication 100 MG: at 09:48

## 2025-07-28 NOTE — FLOWSHEET NOTE
07/27/25 2329   Vital Signs   Temp 97 °F (36.1 °C)   Temp Source Oral   Pulse 92   Heart Rate Source Monitor   Respirations 18   BP (!) 131/97   MAP (Calculated) 108   BP Location Right upper arm   BP Method Automatic   Patient Position High fowlers   Oxygen Therapy   SpO2 96 %   O2 Device None (Room air)        DISPLAY PLAN FREE TEXT

## 2025-07-28 NOTE — FLOWSHEET NOTE
07/27/25 1935   Vital Signs   Temp 97.8 °F (36.6 °C)   Temp Source Oral   Pulse 91   Heart Rate Source Monitor   Respirations 18   /84   MAP (Calculated) 102   BP Location Right upper arm   BP Method Automatic   Patient Position Semi fowlers   Oxygen Therapy   SpO2 95 %   O2 Device None (Room air)     Pt resting comfortably in bed. IV infusing as ordered. Avasys in use. Bed alarm on, call light within reach. No needs expressed at this time. Will continue to monitor.

## 2025-07-28 NOTE — PLAN OF CARE
Problem: Discharge Planning  Goal: Discharge to home or other facility with appropriate resources  7/27/2025 2322 by Jessica Sparks RN  Outcome: Progressing  7/27/2025 1451 by Diamante Robles RN  Outcome: Progressing     Problem: Seizure Precautions  Goal: Remains free of injury related to seizures activity  7/27/2025 2322 by Jessica Sparks RN  Outcome: Progressing  Flowsheets (Taken 7/27/2025 2045)  Remains free of injury related to seizure activity:   Maintain airway, patient safety  and administer oxygen as ordered   Monitor patient for seizure activity, document and report duration and description of seizure to Licensed Independent Practitioner   If seizure occurs, turn patient to side and suction secretions as needed   Reorient patient post seizure   Seizure pads on all 4 side rails   Instruct patient/family to notify RN of any seizure activity   Instruct patient/family to call for assistance with activity based on assessment  7/27/2025 1451 by Diamante Robles RN  Outcome: Progressing     Problem: Safety - Adult  Goal: Free from fall injury  7/27/2025 2322 by Jessica Sparks RN  Outcome: Progressing  7/27/2025 1451 by Diamante Robles RN  Outcome: Progressing     Problem: Skin/Tissue Integrity  Goal: Skin integrity remains intact  Description: 1.  Monitor for areas of redness and/or skin breakdown  2.  Assess vascular access sites hourly  3.  Every 4-6 hours minimum:  Change oxygen saturation probe site  4.  Every 4-6 hours:  If on nasal continuous positive airway pressure, respiratory therapy assess nares and determine need for appliance change or resting period  7/27/2025 2322 by Jessica Sparks RN  Outcome: Progressing  Flowsheets (Taken 7/27/2025 2147)  Skin Integrity Remains Intact: Monitor for areas of redness and/or skin breakdown  7/27/2025 1451 by Diamante Robles RN  Outcome: Progressing     Problem: Safety - Medical Restraint  Goal: Remains free of injury from

## 2025-07-28 NOTE — FLOWSHEET NOTE
07/28/25 0407   Vital Signs   Temp 98.4 °F (36.9 °C)   Temp Source Oral   Pulse 95   Heart Rate Source Monitor   Respirations 18   BP (!) 156/89   MAP (Calculated) 111   BP Location Right upper arm   BP Method Automatic   Patient Position Semi fowlers   Oxygen Therapy   SpO2 95 %   O2 Device None (Room air)

## 2025-07-28 NOTE — PROGRESS NOTES
[facility] Progress Note    Daily Progress Note for 2025 12:15 PM /0307-01  Franklin Mahmood : 1960 Age: 64 y.o. Sex: male  Length of Stay:  5    Interval History:      CC: F/U Alcohol Intoxication (Pt arrives via EMS with alcohol withdrawal, falls, and having delusions. EMS reports pt had 2 falls for them (no LOC -Blood thinners). Pt states he has been going to PT for his gait. Pt drinks a bottle of whiskey every 2-3 days. Last drink @ 1700 yesterday )    Subjective:       MR Mahmood seen drowsy , wife reports he drinks all day and has multiple bottles of BP and various meds, not sure if he taking them regularly     He has been falling at home with alcohol related issues  Pt continues to become alert on conversation and reports he has cut down alcohol cold turkey and started detox symptoms and fall        Objective:     Vitals:    25 0407 25 0730 25 0735 25 1157   BP: (!) 156/89 (!) 166/108 (!) 149/98 (!) 147/98   Pulse: 95 98  96   Resp: 18 16  18   Temp: 98.4 °F (36.9 °C) 98.6 °F (37 °C)  97.9 °F (36.6 °C)   TempSrc: Oral Oral  Oral   SpO2: 95% 96%  95%   Weight:       Height:              Intake/Output Summary (Last 24 hours) at 2025 1215  Last data filed at 2025 0906  Gross per 24 hour   Intake 2145.79 ml   Output 2775 ml   Net -629.21 ml     Body mass index is 28.89 kg/m².    Physical Exam:      General:  middle aged male up in bed, drowsy but becomes  alert and oriented. Appears to be not in any distress  Mucous Membranes:  Pink , anicteric  Neck: No JVD, no carotid bruit, no thyromegaly  Chest:  Clear to auscultation bilaterally, no added sounds  Cardiovascular:  RRR S1S2 heard, no murmurs or gallops  Abdomen:  Soft, undistended, non tender, no organomegaly, BS present  Extremities: left wrist brace noted   No edema or cyanosis. Distal pulses well felt  Neurological : grossly normal with mild drowsiness , no focal findings  No clear detox    LEFT ELBOW:      Study limited as a lateral view of the left elbow was not provided for   review.  There is no definite gross acute fracture or dislocation of the left   elbow.  Recommend obtaining a true lateral view to complete evaluation of the   left elbow.      LEFT WRIST:      Possible acute nondisplaced avulsion fracture of the ulnar styloid.  No   additional fracture or dislocation is identified.  Mild osteoarthritis.         XR HUMERUS LEFT (MIN 2 VIEWS)   Final Result   LEFT HUMERUS:      No acute abnormality of the left humerus.      LEFT ELBOW:      Study limited as a lateral view of the left elbow was not provided for   review.  There is no definite gross acute fracture or dislocation of the left   elbow.  Recommend obtaining a true lateral view to complete evaluation of the   left elbow.      LEFT WRIST:      Possible acute nondisplaced avulsion fracture of the ulnar styloid.  No   additional fracture or dislocation is identified.  Mild osteoarthritis.         XR WRIST LEFT (2 VIEWS)   Final Result   LEFT HUMERUS:      No acute abnormality of the left humerus.      LEFT ELBOW:      Study limited as a lateral view of the left elbow was not provided for   review.  There is no definite gross acute fracture or dislocation of the left   elbow.  Recommend obtaining a true lateral view to complete evaluation of the   left elbow.      LEFT WRIST:      Possible acute nondisplaced avulsion fracture of the ulnar styloid.  No   additional fracture or dislocation is identified.  Mild osteoarthritis.         US GALLBLADDER RUQ   Final Result   1. Diffuse hepatic steatosis.   2. Otherwise, unremarkable right upper quadrant ultrasound.         MRI BRAIN WO CONTRAST   Final Result   1. No acute infarct. Mild generalized parenchymal atrophy and mild white matter signal abnormality, suggestive of chronic small vessel disease.            CT Head W/O Contrast   Final Result   1. No acute intracranial abnormality.   2. No

## 2025-07-28 NOTE — PROGRESS NOTES
Patient assessment as noted per flowsheet  -  when asking orientation questions, patient oriented to person, place, time and situation. Patient stated he lived in [place]  - stated he was from [place] but ended up here due to chasing \"her\" (pointing to his left shoulder area)  Writer asked who were you chasing?  Patient stated again \"her\" Supriya and pointed to his IV pole.  Writer told patient that was an IV pole, not a person.  He stated \"well she was just standing there.\"  Patient also stated he wasn't going to eat his breakfast because he was full from the chicken he just ate  -  patient reoriented to the time being 8am and set up for his breakfast.

## 2025-07-29 PROCEDURE — 6370000000 HC RX 637 (ALT 250 FOR IP)

## 2025-07-29 PROCEDURE — 6360000002 HC RX W HCPCS

## 2025-07-29 PROCEDURE — 2500000003 HC RX 250 WO HCPCS

## 2025-07-29 PROCEDURE — 97110 THERAPEUTIC EXERCISES: CPT

## 2025-07-29 PROCEDURE — 2060000000 HC ICU INTERMEDIATE R&B

## 2025-07-29 PROCEDURE — 6370000000 HC RX 637 (ALT 250 FOR IP): Performed by: INTERNAL MEDICINE

## 2025-07-29 PROCEDURE — 97530 THERAPEUTIC ACTIVITIES: CPT

## 2025-07-29 PROCEDURE — 99232 SBSQ HOSP IP/OBS MODERATE 35: CPT | Performed by: INTERNAL MEDICINE

## 2025-07-29 PROCEDURE — 97535 SELF CARE MNGMENT TRAINING: CPT

## 2025-07-29 RX ADMIN — CHLORDIAZEPOXIDE HYDROCHLORIDE 5 MG: 5 CAPSULE ORAL at 20:03

## 2025-07-29 RX ADMIN — CLONIDINE HYDROCHLORIDE 0.1 MG: 0.1 TABLET ORAL at 08:41

## 2025-07-29 RX ADMIN — CARVEDILOL 6.25 MG: 6.25 TABLET, FILM COATED ORAL at 08:41

## 2025-07-29 RX ADMIN — CHLORDIAZEPOXIDE HYDROCHLORIDE 5 MG: 5 CAPSULE ORAL at 14:28

## 2025-07-29 RX ADMIN — LOSARTAN POTASSIUM 100 MG: 100 TABLET, FILM COATED ORAL at 08:41

## 2025-07-29 RX ADMIN — BUPROPION HYDROCHLORIDE 150 MG: 150 TABLET, EXTENDED RELEASE ORAL at 08:41

## 2025-07-29 RX ADMIN — Medication 100 MG: at 08:41

## 2025-07-29 RX ADMIN — SODIUM CHLORIDE, PRESERVATIVE FREE 10 ML: 5 INJECTION INTRAVENOUS at 20:45

## 2025-07-29 RX ADMIN — AMLODIPINE BESYLATE 5 MG: 5 TABLET ORAL at 08:40

## 2025-07-29 RX ADMIN — SODIUM CHLORIDE, PRESERVATIVE FREE 10 ML: 5 INJECTION INTRAVENOUS at 08:42

## 2025-07-29 RX ADMIN — TAMSULOSIN HYDROCHLORIDE 0.4 MG: 0.4 CAPSULE ORAL at 20:03

## 2025-07-29 RX ADMIN — CLONIDINE HYDROCHLORIDE 0.1 MG: 0.1 TABLET ORAL at 20:04

## 2025-07-29 RX ADMIN — CARVEDILOL 6.25 MG: 6.25 TABLET, FILM COATED ORAL at 16:32

## 2025-07-29 RX ADMIN — ENOXAPARIN SODIUM 30 MG: 100 INJECTION SUBCUTANEOUS at 20:04

## 2025-07-29 RX ADMIN — ENOXAPARIN SODIUM 30 MG: 100 INJECTION SUBCUTANEOUS at 08:42

## 2025-07-29 RX ADMIN — SODIUM CHLORIDE, PRESERVATIVE FREE 10 ML: 5 INJECTION INTRAVENOUS at 08:43

## 2025-07-29 RX ADMIN — ASPIRIN 81 MG: 81 TABLET, COATED ORAL at 08:41

## 2025-07-29 RX ADMIN — CHLORDIAZEPOXIDE HYDROCHLORIDE 5 MG: 5 CAPSULE ORAL at 08:42

## 2025-07-29 RX ADMIN — FOLIC ACID 1 MG: 1 TABLET ORAL at 08:41

## 2025-07-29 RX ADMIN — THERA TABS 1 TABLET: TAB at 08:41

## 2025-07-29 NOTE — PROGRESS NOTES
Patient more drowsy this afternoon.  Slowly arousable.  Patient's spouse updated on the plan of care.  Medications sent home w/ spouse.  Denied questions

## 2025-07-29 NOTE — PROGRESS NOTES
Inpatient Physical Therapy Treatment    Unit: PCU  Date:  7/29/2025  Patient Name:    Franklin Mahmood  Admitting diagnosis:  Alcoholic ketoacidosis [E87.29]  Hypomagnesemia [E83.42]  Wernicke's encephalopathy [E51.2]  Alcohol withdrawal syndrome without complication (HCC) [F10.930]  Admit Date:  7/23/2025  Precautions/Restrictions/WB Status/ Lines/ Wounds/ Oxygen: Fall risk, Bed/chair alarm, and Lines (IV)- L wrist fx awaiting ortho consult    Pt seen for cotreatment this date due to patient safety, patient endurance, limited functional status information, and need for the assistance of 2 skilled therapists    Treatment Time:  1600 - 1706  Treatment Number:  2   Timed Code Treatment Minutes: 66 minutes  Total Treatment Minutes:  66 minutes    Patient Stated Goals for Therapy: none stated         Discharge Recommendations: SNF  DME needs for discharge: Defer to facility       Therapy recommendation for EMS Transport: requires transport by cot due to pt needs A x 2 for safe transfers and pt with poor sitting balance/tolerance    Therapy recommendations for staff:   Assist of 2 for sitting EOB    History of Present Illness:   Per H&P of Elena Kim PA-C 7/23/25  Chief complaint: Alcohol intoxication, + falls, + delusions  Pt reports going to PT for his gait  65 y/o m \"...PMHx of alcohol abuse, hypertension, mood disorder, BPH who presents to [facility] with gait instability and falling at home.  His wife is in the room helping to provide part of the history. This afternoon, he stood up and became extremely lightheaded and collapsed to the ground. It happened again a couple moments later. He denies losing consciousness or hitting his head when lowering himself to the ground. He does have a history of alcohol abuse and withdrawal. He typically drinks about 6 shots of bourbon per day. His last drink was yesterday evening around 4 pm when he had a maxine. His wife also states that he believed people were in their  and use of gait belt and rolling walker (RW)  5).  Tolerate B LE exercises 3 sets of 10-15 reps    Rehabilitation Potential: Good  Strengths for achieving goals include:   Pt cooperative   Barriers to achieving goals include:    Complexity of condition, Chronicity of condition, and Weakness    Plan    To be seen 3-5 x/ week while in acute care setting for therapeutic exercises/activities, bed mobility training, functional transfer training, family/patient education, balance training, neuromuscular reeducation , gait training, and stair training.    Signature: Gigi Celis, PT     If patient discharges from this facility prior to next visit, this note will serve as the Discharge Summary.    CHF Education  N/A

## 2025-07-29 NOTE — PROGRESS NOTES
PT is only answering or understanding the questions to some of the CIWA questions, therefore only partial documentation in EPIC.

## 2025-07-29 NOTE — CARE COORDINATION
Met with pt at bedside. Pt is agreeable to SNF. Pt's first choice is [facility]. Referral called to [facility]. Awaiting return call     1115: [facility] from [facility] accepted pt and is starting precert.

## 2025-07-29 NOTE — PROGRESS NOTES
Bedside shift change report given to Sammie (oncoming nurse) by Tiffany (offgoing nurse). Report included the following information Nurse Handoff Report.

## 2025-07-29 NOTE — PLAN OF CARE
Problem: Discharge Planning  Goal: Discharge to home or other facility with appropriate resources  Outcome: Progressing     Problem: Seizure Precautions  Goal: Remains free of injury related to seizures activity  Outcome: Progressing     Problem: Safety - Adult  Goal: Free from fall injury  Outcome: Progressing     Problem: Skin/Tissue Integrity  Goal: Skin integrity remains intact  Outcome: Progressing     Problem: Safety - Medical Restraint  Goal: Remains free of injury from restraints (Restraint for Interference with Medical Device)  Outcome: Progressing     Problem: Pain  Goal: Verbalizes/displays adequate comfort level or baseline comfort level  Outcome: Progressing     Problem: Genitourinary - Adult  Goal: Absence of urinary retention  Outcome: Progressing

## 2025-07-29 NOTE — PLAN OF CARE
Problem: Discharge Planning  Goal: Discharge to home or other facility with appropriate resources  7/28/2025 2200 by Sammie Simms RN  Outcome: Progressing  7/28/2025 2025 by Casandra Bills RN  Outcome: Progressing     Problem: Seizure Precautions  Goal: Remains free of injury related to seizures activity  7/28/2025 2200 by Sammie Simms RN  Outcome: Progressing  7/28/2025 2025 by Casandra Bills RN  Outcome: Progressing     Problem: Safety - Adult  Goal: Free from fall injury  7/28/2025 2200 by Sammie Simms RN  Outcome: Progressing  7/28/2025 2025 by Casandra Bills RN  Outcome: Progressing     Problem: Skin/Tissue Integrity  Goal: Skin integrity remains intact  Description: 1.  Monitor for areas of redness and/or skin breakdown  2.  Assess vascular access sites hourly  3.  Every 4-6 hours minimum:  Change oxygen saturation probe site  4.  Every 4-6 hours:  If on nasal continuous positive airway pressure, respiratory therapy assess nares and determine need for appliance change or resting period  7/28/2025 2200 by Sammie Simms RN  Outcome: Progressing  7/28/2025 2025 by Casandra Bills RN  Outcome: Progressing     Problem: Safety - Medical Restraint  Goal: Remains free of injury from restraints (Restraint for Interference with Medical Device)  Description: INTERVENTIONS:  1. Determine that other, less restrictive measures have been tried or would not be effective before applying the restraint  2. Evaluate the patient's condition at the time of restraint application  3. Inform patient/family regarding the reason for restraint  4. Q2H: Monitor safety, psychosocial status, comfort, nutrition and hydration  7/28/2025 2200 by Sammie Simms RN  Outcome: Progressing  7/28/2025 2025 by Casandra Bills RN  Outcome: Progressing     Problem: Pain  Goal: Verbalizes/displays adequate comfort level or baseline comfort level  7/28/2025 2200 by Sammie Simms RN  Outcome:

## 2025-07-29 NOTE — PROGRESS NOTES
Inpatient Occupational Therapy Treatment    Unit: PCU  Date:  7/29/2025  Patient Name:    Franklin Mahmood  Admitting diagnosis:  Alcoholic ketoacidosis [E87.29]  Hypomagnesemia [E83.42]  Wernicke's encephalopathy [E51.2]  Alcohol withdrawal syndrome without complication (HCC) [F10.930]  Admit Date:  7/23/2025  Precautions/Restrictions/WB Status/ Lines/ Wounds/ Oxygen: Fall risk, Bed/chair alarm, Lines (IV and external catheter), No BP/sticks left, and Telesitter   Assessment: Left ulnar styloid fracture     Plan:  1) No plans for surgical intervention.  Will order a velcro wrist splint he can wear on the left arm for comfort and support.  Okay for ROM at the wrist and elbow.  Would recommend platform walker if necessary to avoid WB on the wrist for now.  Will plan to see him back in the office in 2 weeks for recheck.    Pt seen for cotreatment this date due to patient safety, patient endurance, complexity of condition, acute illness/injury, and need for the assistance of 2 skilled therapists    Treatment Time:  [id number]  Treatment Number:  2  Timed Code Treatment Minutes:  66 minutes  Total Treatment Minutes:   66 minutes    Patient Goals for Therapy: \"get out of this bed\"         Discharge Recommendations: SNF  DME needs for discharge: Defer to facility       Therapy recommendations for staff:   Assist of 2 for transfers with use of DAVID STEDY and gait belt to/from chair; MAXI MOVE if unable to use DAVID STEDY    History of Present Illness:   Per H&P of Elena Kim PA-C 7/23/25  Chief complaint: Alcohol intoxication, + falls, + delusions  Pt reports going to PT for his gait  63 y/o m \"...PMHx of alcohol abuse, hypertension, mood disorder, BPH who presents to [facility] with gait instability and falling at home.  His wife is in the room helping to provide part of the history. This afternoon, he stood up and became extremely lightheaded and collapsed to the ground. It happened again a couple moments later. He

## 2025-07-29 NOTE — PROGRESS NOTES
[facility] Progress Note    Daily Progress Note for 2025 7:30 AM /0307-01  Franklin Mahmood : 1960 Age: 64 y.o. Sex: male  Length of Stay:  6    Interval History:      CC: F/U Alcohol Intoxication (Pt arrives via EMS with alcohol withdrawal, falls, and having delusions. EMS reports pt had 2 falls for them (no LOC -Blood thinners). Pt states he has been going to PT for his gait. Pt drinks a bottle of whiskey every 2-3 days. Last drink @ 1700 yesterday )    Subjective:       MR Mahmood seen up in bed, awake, alert and oriented  Feels ok today . Still talks intermittently about stuff not in room per staff      He has been falling at home with alcohol related issues  Pt continues to become alert on conversation and reports he has cut down alcohol cold turkey and started detox symptoms and fall        Objective:     Vitals:    25 1534 25 1931 25 2245 25 0245   BP: 134/89 (!) 148/95 (!) 142/85 (!) 156/89   Pulse: 89 88 81 82   Resp: 16 16 16 16   Temp: 99.1 °F (37.3 °C) 97.7 °F (36.5 °C) 98 °F (36.7 °C) 98.3 °F (36.8 °C)   TempSrc: Oral Oral Oral Oral   SpO2: 94% 95% 94% 95%   Weight:       Height:              Intake/Output Summary (Last 24 hours) at 2025 0730  Last data filed at 2025 0652  Gross per 24 hour   Intake 867 ml   Output 2900 ml   Net -2033 ml     Body mass index is 28.89 kg/m².    Physical Exam:      General:  middle aged male up in bed, remains alert and oriented. Appears to be not in any distress  Mucous Membranes:  Pink , anicteric  Neck: No JVD, no carotid bruit, no thyromegaly  Chest:  Clear to auscultation bilaterally, no added sounds  Cardiovascular:  RRR S1S2 heard, no murmurs or gallops  Abdomen:  Soft, undistended, non tender, no organomegaly, BS present  Extremities: left wrist brace noted   No edema or cyanosis. Distal pulses well felt  Neurological : grossly normal with no clear withdrawal symptoms  No hallucinations today , no  abnormality of the left humerus.      LEFT ELBOW:      Study limited as a lateral view of the left elbow was not provided for   review.  There is no definite gross acute fracture or dislocation of the left   elbow.  Recommend obtaining a true lateral view to complete evaluation of the   left elbow.      LEFT WRIST:      Possible acute nondisplaced avulsion fracture of the ulnar styloid.  No   additional fracture or dislocation is identified.  Mild osteoarthritis.         XR HUMERUS LEFT (MIN 2 VIEWS)   Final Result   LEFT HUMERUS:      No acute abnormality of the left humerus.      LEFT ELBOW:      Study limited as a lateral view of the left elbow was not provided for   review.  There is no definite gross acute fracture or dislocation of the left   elbow.  Recommend obtaining a true lateral view to complete evaluation of the   left elbow.      LEFT WRIST:      Possible acute nondisplaced avulsion fracture of the ulnar styloid.  No   additional fracture or dislocation is identified.  Mild osteoarthritis.         XR WRIST LEFT (2 VIEWS)   Final Result   LEFT HUMERUS:      No acute abnormality of the left humerus.      LEFT ELBOW:      Study limited as a lateral view of the left elbow was not provided for   review.  There is no definite gross acute fracture or dislocation of the left   elbow.  Recommend obtaining a true lateral view to complete evaluation of the   left elbow.      LEFT WRIST:      Possible acute nondisplaced avulsion fracture of the ulnar styloid.  No   additional fracture or dislocation is identified.  Mild osteoarthritis.         US GALLBLADDER RUQ   Final Result   1. Diffuse hepatic steatosis.   2. Otherwise, unremarkable right upper quadrant ultrasound.         MRI BRAIN WO CONTRAST   Final Result   1. No acute infarct. Mild generalized parenchymal atrophy and mild white matter signal abnormality, suggestive of chronic small vessel disease.            CT Head W/O Contrast   Final Result   1. No

## 2025-07-29 NOTE — PLAN OF CARE
Problem: Discharge Planning  Goal: Discharge to home or other facility with appropriate resources  7/29/2025 1026 by Tiffany Blake, RN  Outcome: Progressing  Flowsheets (Taken 7/29/2025 0755)  Discharge to home or other facility with appropriate resources:   Identify barriers to discharge with patient and caregiver   Arrange for needed discharge resources and transportation as appropriate   Identify discharge learning needs (meds, wound care, etc)   Arrange for interpreters to assist at discharge as needed   Refer to discharge planning if patient needs post-hospital services based on physician order or complex needs related to functional status, cognitive ability or social support system  7/28/2025 2200 by Sammie Simms, RN  Outcome: Progressing     Problem: Seizure Precautions  Goal: Remains free of injury related to seizures activity  7/29/2025 1026 by Tiffany Blake RN  Outcome: Progressing  Flowsheets (Taken 7/29/2025 0755)  Remains free of injury related to seizure activity:   Maintain airway, patient safety  and administer oxygen as ordered   Monitor patient for seizure activity, document and report duration and description of seizure to Licensed Independent Practitioner   If seizure occurs, turn patient to side and suction secretions as needed   Reorient patient post seizure   Seizure pads on all 4 side rails   Instruct patient/family to notify RN of any seizure activity   Instruct patient/family to call for assistance with activity based on assessment  7/28/2025 2200 by Sammie Simms, RN  Outcome: Progressing     Problem: Safety - Adult  Goal: Free from fall injury  7/29/2025 1026 by Tiffany Blake, RN  Outcome: Progressing  7/28/2025 2200 by Sammie Simms, RN  Outcome: Progressing     Problem: Skin/Tissue Integrity  Goal: Skin integrity remains intact  Description: 1.  Monitor for areas of redness and/or skin breakdown  2.  Assess vascular access sites hourly  3.  Every 4-6 hours minimum:   Change oxygen saturation probe site  4.  Every 4-6 hours:  If on nasal continuous positive airway pressure, respiratory therapy assess nares and determine need for appliance change or resting period  7/29/2025 1026 by Tiffany Blake RN  Outcome: Progressing  Flowsheets (Taken 7/29/2025 0755)  Skin Integrity Remains Intact:   Monitor for areas of redness and/or skin breakdown   Assess vascular access sites hourly   Every 4-6 hours minimum:  Change oxygen saturation probe site   Every 4-6 hours:  If on nasal continuous positive airway pressure, assess nares and determine need for appliance change or resting period   Turn and reposition as indicated   Assess need for specialty bed   Positioning devices   Pressure redistribution bed/mattress (bed type)   Check visual cues for pain  7/28/2025 2200 by Sammie Simms RN  Outcome: Progressing     Problem: Safety - Medical Restraint  Goal: Remains free of injury from restraints (Restraint for Interference with Medical Device)  Description: INTERVENTIONS:  1. Determine that other, less restrictive measures have been tried or would not be effective before applying the restraint  2. Evaluate the patient's condition at the time of restraint application  3. Inform patient/family regarding the reason for restraint  4. Q2H: Monitor safety, psychosocial status, comfort, nutrition and hydration  7/29/2025 1026 by Tiffany Blake RN  Outcome: Progressing  7/28/2025 2200 by Sammie Simms, RN  Outcome: Progressing     Problem: Pain  Goal: Verbalizes/displays adequate comfort level or baseline comfort level  7/29/2025 1026 by Tiffany Blake RN  Outcome: Progressing  7/28/2025 2200 by Sammie Simms, RN  Outcome: Progressing     Problem: Genitourinary - Adult  Goal: Absence of urinary retention  7/29/2025 1026 by Tiffany Blake, RN  Outcome: Progressing  Flowsheets (Taken 7/29/2025 0755)  Absence of urinary retention:   Assess patient’s ability to void and empty bladder

## 2025-07-29 NOTE — PROGRESS NOTES
PM assessment completed. Scheduled medications given per MAR. VSS room air, A/O x4, Patient does not appear in distress and denies any needs at this time. Call light in reach, will monitor, bed alarm on.

## 2025-07-29 NOTE — PROGRESS NOTES
Patient assisted back to bed w/ use of steady w/ assist of 3 staff  -  tolerated fairly well  -  Siderails up X2  -  call light in patient's reach.  Telesitter active.

## 2025-07-30 LAB
ALBUMIN SERPL-MCNC: 2.8 G/DL (ref 3.4–5)
ALBUMIN/GLOB SERPL: 0.9 {RATIO} (ref 1.1–2.2)
ALP SERPL-CCNC: 151 U/L (ref 40–129)
ALT SERPL-CCNC: 33 U/L (ref 10–40)
ANION GAP SERPL CALCULATED.3IONS-SCNC: 12 MMOL/L (ref 3–16)
AST SERPL-CCNC: 43 U/L (ref 15–37)
BILIRUB SERPL-MCNC: 1 MG/DL (ref 0–1)
BUN SERPL-MCNC: 9 MG/DL (ref 7–20)
CALCIUM SERPL-MCNC: 8.3 MG/DL (ref 8.3–10.6)
CHLORIDE SERPL-SCNC: 101 MMOL/L (ref 99–110)
CO2 SERPL-SCNC: 25 MMOL/L (ref 21–32)
CREAT SERPL-MCNC: 0.8 MG/DL (ref 0.8–1.3)
GFR SERPLBLD CREATININE-BSD FMLA CKD-EPI: >90 ML/MIN/{1.73_M2}
GLUCOSE SERPL-MCNC: 110 MG/DL (ref 70–99)
MAGNESIUM SERPL-MCNC: 1.58 MG/DL (ref 1.8–2.4)
POTASSIUM SERPL-SCNC: 3 MMOL/L (ref 3.5–5.1)
PROT SERPL-MCNC: 6 G/DL (ref 6.4–8.2)
SODIUM SERPL-SCNC: 138 MMOL/L (ref 136–145)

## 2025-07-30 PROCEDURE — 6360000002 HC RX W HCPCS

## 2025-07-30 PROCEDURE — 6370000000 HC RX 637 (ALT 250 FOR IP)

## 2025-07-30 PROCEDURE — 36415 COLL VENOUS BLD VENIPUNCTURE: CPT

## 2025-07-30 PROCEDURE — 2500000003 HC RX 250 WO HCPCS

## 2025-07-30 PROCEDURE — 6370000000 HC RX 637 (ALT 250 FOR IP): Performed by: INTERNAL MEDICINE

## 2025-07-30 PROCEDURE — 80053 COMPREHEN METABOLIC PANEL: CPT

## 2025-07-30 PROCEDURE — 2060000000 HC ICU INTERMEDIATE R&B

## 2025-07-30 PROCEDURE — 99232 SBSQ HOSP IP/OBS MODERATE 35: CPT | Performed by: INTERNAL MEDICINE

## 2025-07-30 PROCEDURE — 83735 ASSAY OF MAGNESIUM: CPT

## 2025-07-30 PROCEDURE — 6360000002 HC RX W HCPCS: Performed by: INTERNAL MEDICINE

## 2025-07-30 RX ORDER — LANOLIN ALCOHOL/MO/W.PET/CERES
100 CREAM (GRAM) TOPICAL DAILY
Qty: 30 TABLET | Refills: 3 | Status: SHIPPED | OUTPATIENT
Start: 2025-07-30

## 2025-07-30 RX ORDER — POTASSIUM CHLORIDE 1500 MG/1
40 TABLET, EXTENDED RELEASE ORAL ONCE
Status: COMPLETED | OUTPATIENT
Start: 2025-07-30 | End: 2025-07-30

## 2025-07-30 RX ORDER — CHLORDIAZEPOXIDE HYDROCHLORIDE 5 MG/1
5 CAPSULE, GELATIN COATED ORAL 3 TIMES DAILY
Qty: 9 CAPSULE | Refills: 0 | Status: SHIPPED | OUTPATIENT
Start: 2025-07-30 | End: 2025-08-02 | Stop reason: HOSPADM

## 2025-07-30 RX ORDER — CLONIDINE HYDROCHLORIDE 0.2 MG/1
0.1 TABLET ORAL 2 TIMES DAILY
Qty: 60 TABLET | Refills: 3
Start: 2025-07-30 | End: 2025-08-02 | Stop reason: HOSPADM

## 2025-07-30 RX ORDER — MAGNESIUM SULFATE IN WATER 40 MG/ML
2000 INJECTION, SOLUTION INTRAVENOUS ONCE
Status: COMPLETED | OUTPATIENT
Start: 2025-07-30 | End: 2025-07-30

## 2025-07-30 RX ADMIN — CHLORDIAZEPOXIDE HYDROCHLORIDE 5 MG: 5 CAPSULE ORAL at 14:28

## 2025-07-30 RX ADMIN — CARVEDILOL 6.25 MG: 6.25 TABLET, FILM COATED ORAL at 08:03

## 2025-07-30 RX ADMIN — FOLIC ACID 1 MG: 1 TABLET ORAL at 08:02

## 2025-07-30 RX ADMIN — Medication 100 MG: at 08:03

## 2025-07-30 RX ADMIN — SODIUM CHLORIDE, PRESERVATIVE FREE 10 ML: 5 INJECTION INTRAVENOUS at 08:04

## 2025-07-30 RX ADMIN — ENOXAPARIN SODIUM 30 MG: 100 INJECTION SUBCUTANEOUS at 08:03

## 2025-07-30 RX ADMIN — CLONIDINE HYDROCHLORIDE 0.1 MG: 0.1 TABLET ORAL at 20:02

## 2025-07-30 RX ADMIN — ENOXAPARIN SODIUM 30 MG: 100 INJECTION SUBCUTANEOUS at 20:02

## 2025-07-30 RX ADMIN — ASPIRIN 81 MG: 81 TABLET, COATED ORAL at 08:04

## 2025-07-30 RX ADMIN — SODIUM CHLORIDE, PRESERVATIVE FREE 10 ML: 5 INJECTION INTRAVENOUS at 21:14

## 2025-07-30 RX ADMIN — CLONIDINE HYDROCHLORIDE 0.1 MG: 0.1 TABLET ORAL at 08:02

## 2025-07-30 RX ADMIN — AMLODIPINE BESYLATE 5 MG: 5 TABLET ORAL at 08:03

## 2025-07-30 RX ADMIN — THERA TABS 1 TABLET: TAB at 08:03

## 2025-07-30 RX ADMIN — LOSARTAN POTASSIUM 100 MG: 100 TABLET, FILM COATED ORAL at 08:03

## 2025-07-30 RX ADMIN — ACETAMINOPHEN 650 MG: 325 TABLET ORAL at 02:25

## 2025-07-30 RX ADMIN — SODIUM CHLORIDE, PRESERVATIVE FREE 10 ML: 5 INJECTION INTRAVENOUS at 21:15

## 2025-07-30 RX ADMIN — CHLORDIAZEPOXIDE HYDROCHLORIDE 5 MG: 5 CAPSULE ORAL at 08:04

## 2025-07-30 RX ADMIN — TAMSULOSIN HYDROCHLORIDE 0.4 MG: 0.4 CAPSULE ORAL at 20:01

## 2025-07-30 RX ADMIN — BUPROPION HYDROCHLORIDE 150 MG: 150 TABLET, EXTENDED RELEASE ORAL at 08:04

## 2025-07-30 RX ADMIN — MAGNESIUM SULFATE HEPTAHYDRATE 2000 MG: 40 INJECTION, SOLUTION INTRAVENOUS at 12:23

## 2025-07-30 RX ADMIN — CHLORDIAZEPOXIDE HYDROCHLORIDE 5 MG: 5 CAPSULE ORAL at 20:02

## 2025-07-30 RX ADMIN — POTASSIUM CHLORIDE 40 MEQ: 1500 TABLET, EXTENDED RELEASE ORAL at 08:53

## 2025-07-30 RX ADMIN — CARVEDILOL 6.25 MG: 6.25 TABLET, FILM COATED ORAL at 16:48

## 2025-07-30 ASSESSMENT — PAIN - FUNCTIONAL ASSESSMENT: PAIN_FUNCTIONAL_ASSESSMENT: ACTIVITIES ARE NOT PREVENTED

## 2025-07-30 ASSESSMENT — PAIN DESCRIPTION - DESCRIPTORS: DESCRIPTORS: ACHING

## 2025-07-30 ASSESSMENT — PAIN SCALES - GENERAL
PAINLEVEL_OUTOF10: 6
PAINLEVEL_OUTOF10: 1

## 2025-07-30 ASSESSMENT — PAIN DESCRIPTION - PAIN TYPE: TYPE: CHRONIC PAIN

## 2025-07-30 ASSESSMENT — PAIN DESCRIPTION - LOCATION
LOCATION: ANKLE
LOCATION: BACK

## 2025-07-30 ASSESSMENT — PAIN DESCRIPTION - ORIENTATION
ORIENTATION: LEFT
ORIENTATION: POSTERIOR

## 2025-07-30 ASSESSMENT — PAIN DESCRIPTION - ONSET: ONSET: GRADUAL

## 2025-07-30 ASSESSMENT — PAIN DESCRIPTION - FREQUENCY: FREQUENCY: CONTINUOUS

## 2025-07-30 NOTE — PROGRESS NOTES
PM assessment completed. Scheduled medications given per MAR. VSS room air, A/O x4, with confusion. Patient states still having hallucinations of seeing bugs flying around. Patient does not appear in distress and denies any needs at this time. Call light in reach, will monitor, bed alarm in, telesitter in room.

## 2025-07-30 NOTE — PLAN OF CARE
Problem: Discharge Planning  Goal: Discharge to home or other facility with appropriate resources  7/29/2025 2011 by Sammie Simms, RN  Outcome: Progressing  7/29/2025 1026 by Tiffany Blake RN  Outcome: Progressing  Flowsheets (Taken 7/29/2025 0755)  Discharge to home or other facility with appropriate resources:   Identify barriers to discharge with patient and caregiver   Arrange for needed discharge resources and transportation as appropriate   Identify discharge learning needs (meds, wound care, etc)   Arrange for interpreters to assist at discharge as needed   Refer to discharge planning if patient needs post-hospital services based on physician order or complex needs related to functional status, cognitive ability or social support system     Problem: Seizure Precautions  Goal: Remains free of injury related to seizures activity  7/29/2025 2011 by Sammie Simms RN  Outcome: Progressing  7/29/2025 1026 by Tiffany Blake RN  Outcome: Progressing  Flowsheets  Taken 7/29/2025 0925  Remains free of injury related to seizure activity:   Maintain airway, patient safety  and administer oxygen as ordered   Monitor patient for seizure activity, document and report duration and description of seizure to Licensed Independent Practitioner   If seizure occurs, turn patient to side and suction secretions as needed   Reorient patient post seizure   Seizure pads on all 4 side rails   Instruct patient/family to notify RN of any seizure activity   Instruct patient/family to call for assistance with activity based on assessment  Taken 7/29/2025 0755  Remains free of injury related to seizure activity:   Maintain airway, patient safety  and administer oxygen as ordered   Monitor patient for seizure activity, document and report duration and description of seizure to Licensed Independent Practitioner   If seizure occurs, turn patient to side and suction secretions as needed   Reorient patient post seizure   Seizure

## 2025-07-30 NOTE — PROGRESS NOTES
Patient K 3.0, discussed oral form vs. IV for due to parameters. Hospitalist confirmed order 40 Oral K.

## 2025-07-30 NOTE — PLAN OF CARE
Problem: Discharge Planning  Goal: Discharge to home or other facility with appropriate resources  7/30/2025 0849 by Tiffany Blake RN  Outcome: Progressing  Flowsheets (Taken 7/30/2025 0745)  Discharge to home or other facility with appropriate resources:   Identify barriers to discharge with patient and caregiver   Arrange for needed discharge resources and transportation as appropriate   Identify discharge learning needs (meds, wound care, etc)   Arrange for interpreters to assist at discharge as needed   Refer to discharge planning if patient needs post-hospital services based on physician order or complex needs related to functional status, cognitive ability or social support system  7/29/2025 2011 by Sammie Simms, RN  Outcome: Progressing     Problem: Seizure Precautions  Goal: Remains free of injury related to seizures activity  7/30/2025 0849 by Tiffany Blake RN  Outcome: Progressing  7/29/2025 2011 by Sammie Simms, RN  Outcome: Progressing     Problem: Safety - Adult  Goal: Free from fall injury  7/30/2025 0849 by Tiffany Blake RN  Outcome: Progressing  7/29/2025 2011 by Sammie Simms RN  Outcome: Progressing     Problem: Skin/Tissue Integrity  Goal: Skin integrity remains intact  Description: 1.  Monitor for areas of redness and/or skin breakdown  2.  Assess vascular access sites hourly  3.  Every 4-6 hours minimum:  Change oxygen saturation probe site  4.  Every 4-6 hours:  If on nasal continuous positive airway pressure, respiratory therapy assess nares and determine need for appliance change or resting period  7/30/2025 0849 by Tiffany Blake RN  Outcome: Progressing  Flowsheets (Taken 7/30/2025 0745)  Skin Integrity Remains Intact:   Monitor for areas of redness and/or skin breakdown   Assess vascular access sites hourly   Every 4-6 hours minimum:  Change oxygen saturation probe site   Every 4-6 hours:  If on nasal continuous positive airway pressure, assess nares and determine

## 2025-07-31 LAB
ANION GAP SERPL CALCULATED.3IONS-SCNC: 13 MMOL/L (ref 3–16)
ANION GAP SERPL CALCULATED.3IONS-SCNC: 14 MMOL/L (ref 3–16)
BUN SERPL-MCNC: 17 MG/DL (ref 7–20)
BUN SERPL-MCNC: 19 MG/DL (ref 7–20)
CALCIUM SERPL-MCNC: 9.3 MG/DL (ref 8.3–10.6)
CALCIUM SERPL-MCNC: 9.4 MG/DL (ref 8.3–10.6)
CHLORIDE SERPL-SCNC: 102 MMOL/L (ref 99–110)
CHLORIDE SERPL-SCNC: 103 MMOL/L (ref 99–110)
CO2 SERPL-SCNC: 23 MMOL/L (ref 21–32)
CO2 SERPL-SCNC: 24 MMOL/L (ref 21–32)
CREAT SERPL-MCNC: 2 MG/DL (ref 0.8–1.3)
CREAT SERPL-MCNC: 2.3 MG/DL (ref 0.8–1.3)
GFR SERPLBLD CREATININE-BSD FMLA CKD-EPI: 31 ML/MIN/{1.73_M2}
GFR SERPLBLD CREATININE-BSD FMLA CKD-EPI: 37 ML/MIN/{1.73_M2}
GLUCOSE SERPL-MCNC: 153 MG/DL (ref 70–99)
GLUCOSE SERPL-MCNC: 173 MG/DL (ref 70–99)
MAGNESIUM SERPL-MCNC: 1.9 MG/DL (ref 1.8–2.4)
POTASSIUM SERPL-SCNC: 3.7 MMOL/L (ref 3.5–5.1)
POTASSIUM SERPL-SCNC: 3.8 MMOL/L (ref 3.5–5.1)
SODIUM SERPL-SCNC: 139 MMOL/L (ref 136–145)
SODIUM SERPL-SCNC: 140 MMOL/L (ref 136–145)

## 2025-07-31 PROCEDURE — 6360000002 HC RX W HCPCS

## 2025-07-31 PROCEDURE — 51798 US URINE CAPACITY MEASURE: CPT

## 2025-07-31 PROCEDURE — 2060000000 HC ICU INTERMEDIATE R&B

## 2025-07-31 PROCEDURE — 80048 BASIC METABOLIC PNL TOTAL CA: CPT

## 2025-07-31 PROCEDURE — 99232 SBSQ HOSP IP/OBS MODERATE 35: CPT | Performed by: INTERNAL MEDICINE

## 2025-07-31 PROCEDURE — 2580000003 HC RX 258: Performed by: INTERNAL MEDICINE

## 2025-07-31 PROCEDURE — 97530 THERAPEUTIC ACTIVITIES: CPT

## 2025-07-31 PROCEDURE — 97110 THERAPEUTIC EXERCISES: CPT

## 2025-07-31 PROCEDURE — 97535 SELF CARE MNGMENT TRAINING: CPT

## 2025-07-31 PROCEDURE — 83735 ASSAY OF MAGNESIUM: CPT

## 2025-07-31 PROCEDURE — 6370000000 HC RX 637 (ALT 250 FOR IP)

## 2025-07-31 PROCEDURE — 6370000000 HC RX 637 (ALT 250 FOR IP): Performed by: INTERNAL MEDICINE

## 2025-07-31 PROCEDURE — 2500000003 HC RX 250 WO HCPCS

## 2025-07-31 PROCEDURE — 36415 COLL VENOUS BLD VENIPUNCTURE: CPT

## 2025-07-31 RX ORDER — 0.9 % SODIUM CHLORIDE 0.9 %
500 INTRAVENOUS SOLUTION INTRAVENOUS ONCE
Status: COMPLETED | OUTPATIENT
Start: 2025-07-31 | End: 2025-07-31

## 2025-07-31 RX ORDER — SODIUM CHLORIDE 9 MG/ML
INJECTION, SOLUTION INTRAVENOUS CONTINUOUS
Status: DISCONTINUED | OUTPATIENT
Start: 2025-07-31 | End: 2025-08-01

## 2025-07-31 RX ADMIN — THERA TABS 1 TABLET: TAB at 08:51

## 2025-07-31 RX ADMIN — CHLORDIAZEPOXIDE HYDROCHLORIDE 5 MG: 5 CAPSULE ORAL at 08:50

## 2025-07-31 RX ADMIN — ENOXAPARIN SODIUM 30 MG: 100 INJECTION SUBCUTANEOUS at 19:50

## 2025-07-31 RX ADMIN — CLONIDINE HYDROCHLORIDE 0.1 MG: 0.1 TABLET ORAL at 08:51

## 2025-07-31 RX ADMIN — TAMSULOSIN HYDROCHLORIDE 0.4 MG: 0.4 CAPSULE ORAL at 19:50

## 2025-07-31 RX ADMIN — Medication 100 MG: at 08:51

## 2025-07-31 RX ADMIN — LOSARTAN POTASSIUM 100 MG: 100 TABLET, FILM COATED ORAL at 08:51

## 2025-07-31 RX ADMIN — AMLODIPINE BESYLATE 5 MG: 5 TABLET ORAL at 08:51

## 2025-07-31 RX ADMIN — ASPIRIN 81 MG: 81 TABLET, COATED ORAL at 08:51

## 2025-07-31 RX ADMIN — CHLORDIAZEPOXIDE HYDROCHLORIDE 5 MG: 5 CAPSULE ORAL at 15:29

## 2025-07-31 RX ADMIN — SODIUM CHLORIDE 500 ML: 0.9 INJECTION, SOLUTION INTRAVENOUS at 13:54

## 2025-07-31 RX ADMIN — SODIUM CHLORIDE, PRESERVATIVE FREE 10 ML: 5 INJECTION INTRAVENOUS at 21:22

## 2025-07-31 RX ADMIN — CARVEDILOL 6.25 MG: 6.25 TABLET, FILM COATED ORAL at 08:51

## 2025-07-31 RX ADMIN — FOLIC ACID 1 MG: 1 TABLET ORAL at 08:51

## 2025-07-31 RX ADMIN — BUPROPION HYDROCHLORIDE 150 MG: 150 TABLET, EXTENDED RELEASE ORAL at 08:51

## 2025-07-31 RX ADMIN — POLYETHYLENE GLYCOL (3350) 17 G: 17 POWDER, FOR SOLUTION ORAL at 19:54

## 2025-07-31 RX ADMIN — ENOXAPARIN SODIUM 30 MG: 100 INJECTION SUBCUTANEOUS at 08:51

## 2025-07-31 RX ADMIN — ACETAMINOPHEN 650 MG: 325 TABLET ORAL at 10:48

## 2025-07-31 RX ADMIN — SODIUM CHLORIDE: 0.9 INJECTION, SOLUTION INTRAVENOUS at 19:48

## 2025-07-31 RX ADMIN — SODIUM CHLORIDE: 0.9 INJECTION, SOLUTION INTRAVENOUS at 15:28

## 2025-07-31 RX ADMIN — SODIUM CHLORIDE, PRESERVATIVE FREE 10 ML: 5 INJECTION INTRAVENOUS at 08:51

## 2025-07-31 RX ADMIN — CARVEDILOL 6.25 MG: 6.25 TABLET, FILM COATED ORAL at 16:40

## 2025-07-31 ASSESSMENT — PAIN DESCRIPTION - DESCRIPTORS: DESCRIPTORS: ACHING;DISCOMFORT

## 2025-07-31 ASSESSMENT — PAIN SCALES - GENERAL: PAINLEVEL_OUTOF10: 5

## 2025-07-31 ASSESSMENT — PAIN DESCRIPTION - LOCATION: LOCATION: BACK;GENERALIZED;KNEE

## 2025-07-31 ASSESSMENT — PAIN DESCRIPTION - ORIENTATION: ORIENTATION: RIGHT;LOWER;MID

## 2025-07-31 ASSESSMENT — PAIN - FUNCTIONAL ASSESSMENT: PAIN_FUNCTIONAL_ASSESSMENT: ACTIVITIES ARE NOT PREVENTED

## 2025-07-31 NOTE — PROGRESS NOTES
Bedside report and transfer of care given to JUDY Cochran. Pt currently resting in bed with the call light within reach. Pt denies any other care needs at this time. Pt stable at this time.

## 2025-07-31 NOTE — PROGRESS NOTES
Comprehensive Nutrition Assessment    Type and Reason for Visit:  Initial, LOS (LOS x 8 days)    Nutrition Recommendations/Plan:   Continue ADULT DIET; Regular diet order + Safety Tray (Disposables) restriction.   Monitor appetite and po intake.   Please obtain an actual, current weight for this patient - only a stated weight was obtained on 7/23/25.   Monitor nutrition-related labs, bowel function, and weight trends.      Malnutrition Assessment:  Malnutrition Status:  At risk for malnutrition (07/31/25 1233)    Context:  Acute Illness     Findings of the 6 clinical characteristics of malnutrition:  Energy Intake:  50% or less of estimated energy requirements for 5 or more days  Weight Loss:  Unable to assess     Body Fat Loss:  No body fat loss     Muscle Mass Loss:  No muscle mass loss    Fluid Accumulation:  No fluid accumulation     Strength:  Not Performed    Nutrition Assessment:    patient was nutritionally compromised upon admission - inadequate oral intake r/t inadequate protein-energy intake and psychological cause or life stress + decreased appetite AEB poor po intake PTA, hx of alcoholism, and altered mental status + unsteady gait with falls PTA; he is improved from a nutritional standpoint AEB he is consuming > 50% of his meals at this time during admission; will continue ADULT DIET; Regular diet order + Safety Tray (Disposables) restriction    Nutrition Related Findings:    patient is A & O x 4; patient presented with alcohol intoxication and w/d symptoms + dizziness and falls PTA; patient reported that he drinks ~ 1 bottle of whiskey every 2-3 days; patient's po intake was decreased from 7/24-7/28 but has improved and patient is consuming 51-75% or % of his meals now; patient is from home with spouse; + confusion and altered mental status upon admission & during beginning of admission but mental status is currently clear; patient is on disability Wound Type: None       Current Nutrition

## 2025-07-31 NOTE — PLAN OF CARE
Problem: Safety - Adult  Goal: Free from fall injury  7/31/2025 0921 by Alonso Feldman, RN  Outcome: Progressing  7/30/2025 2137 by Sammie Simms, RN  Outcome: Progressing     Problem: Skin/Tissue Integrity  Goal: Skin integrity remains intact  Description: 1.  Monitor for areas of redness and/or skin breakdown  2.  Assess vascular access sites hourly  3.  Every 4-6 hours minimum:  Change oxygen saturation probe site  4.  Every 4-6 hours:  If on nasal continuous positive airway pressure, respiratory therapy assess nares and determine need for appliance change or resting period  7/31/2025 0921 by Alonso Feldman, RN  Outcome: Progressing  Flowsheets (Taken 7/31/2025 0916)  Skin Integrity Remains Intact: Monitor for areas of redness and/or skin breakdown  7/30/2025 2137 by Sammie Simms, RN  Outcome: Progressing

## 2025-07-31 NOTE — PROGRESS NOTES
history. This afternoon, he stood up and became extremely lightheaded and collapsed to the ground. It happened again a couple moments later. He denies losing consciousness or hitting his head when lowering himself to the ground. He does have a history of alcohol abuse and withdrawal. He typically drinks about 6 shots of bourbon per day. His last drink was yesterday evening around 4 pm when he had a maxine. His wife also states that he believed people were in their house when in actuality no one was there. She also states that he believed people were playing pinAuterra pong in the ED prior to my exam. He denies a history of seizure from withdrawal. He has a history of gait instability for which he has been going to balance therapy for the past couple of months. He states that this has been an ongoing issue for years. His prior pcp believed the lightheadedness and falling could be due to orthostatic hypotension.  He denies chest pain, loss of consciousness, nausea, vomiting, and diarrhea.   History obtained from the patient and review of EMR. \"     Preadmission Environment:   Pt. Lives                                              Spouse (around 24/7, in fair health)   Home environment:                            condo  Steps to enter first floor:                     one steps to enter with handrail  Steps to second floor/basement:        Full flight of 12-13 and hand rail: unilateral  Laundry:                                              Basement  Bathroom:                                           tub/shower unit and standard height toilet  Pt sleeps in a:                                     Flat bed  Equipment owned:                              RW, SPC, and CPAP (Pt borrows RW from neighbor)     Preadmission Status:  Pt. Able to drive:                                 Yes  Pt. Fully independent with ADLs:       Yes  Pt. Required assistance for:               Independent PTA  Pt. independent for functional transfers and  above impairments, in order to help the patient make a full return to PLOF without complaints of pain, difficulty or compensation.     Recommending SNF upon discharge as patient functioning below baseline, demonstrates good rehab potential and unable to return home due to burden of care beyond caregiver ability and home environment not conducive to patient recovery.    Goals : All goals ongoing as of 7/31  To be met in 3 visits:  1). Independent with LE Ex x 10 reps - MET 7/29/25  2). Sit to/from stand: Max A , 2 person, and With RW and gait belt  3). Bed to chair: Max A , 2 person, and With RW and gait belt  4). CHF goal: N/A    To be met in 6 visits:  1).  Supine to/from sit: Mod A  and 2 person  2).  Sit to/from stand: Mod A , 2 person, and With RW and gait belt  3).  Bed to chair: Mod A , 2 person, and With RW and gait belt  4).  Gait: Ambulate 5 ft.   with Max A  and use of gait belt and rolling walker (RW)  5).  Tolerate B LE exercises 3 sets of 10-15 reps    Rehabilitation Potential: Good  Strengths for achieving goals include:   Pt cooperative   Barriers to achieving goals include:    Complexity of condition, Chronicity of condition, and Weakness    Plan    To be seen 3-5 x/ week while in acute care setting for therapeutic exercises/activities, bed mobility training, functional transfer training, family/patient education, balance training, neuromuscular reeducation , gait training, and stair training.    Signature: Gigi Celis, PT     If patient discharges from this facility prior to next visit, this note will serve as the Discharge Summary.    CHF Education  N/A

## 2025-07-31 NOTE — PROGRESS NOTES
intracranial abnormality.   2. No acute osseous abnormality of the cervical spine.             Lab Results   Component Value Date    LABA1C 5.1 07/24/2025      Body mass index is 28.88 kg/m².       Impression/Plan:      Alcohol abuse with acute withdrawal  -reports drinking a bottle of whiskey q2-3 days, last drink 1500 on 7/22.  -EtOH level: undetectable on adm  -fall and seizure precautions.  -SARAY Librium   -CIWA protocol with PRN Ativan/Valium.  -started multivitamin, folate, and thiamine.  -IVF support given   - cessation recommended and pt willing  -CM for resources.      Concern for Wernicke's encephalopathy  -confusion, speech changes, dizziness, gait abnormality/falls.  -reportedly going to PT outpatient for gait.  -CT head & C-spine - nonacute.  -fall precautions.  -PT/OT.  -MRI ordered - No acute infarct. Mild generalized parenchymal atrophy and mild white matter signal abnormality, suggestive of chronic small vessel disease   -neurology consulted.  -initiated high-dose IV Thiamine, now on PO.   -ammonia neg  -PT/OT recommending SNF.    Ulnar styloid fracture, left  - Wife reports he crashed into table(s)and falls  which were broken so this was likely POA  - Ortho consulted  - apply ice, pain control pending further recs.   -wrist splint ordered,     Hypokalemia. - severe -resolved  Hypomagnesemia- resolved.  -K 3.0, mag 1.04 on admission.  -replacement ordered.  -continue to monitor and replete PRN.     Transaminitis.  -alk phos 165, ALT 63, AST 77, total bili 1.5  -likely in the setting of alcohol abuse.  -monitor daily LFTs.  - hepatitis panel neg - neg  - RUQ US - diffuse hepatic steatosis  - INR normal  - improving slowly      Pancytopenia  -WBC 2.5, hemoglobin 13.3, platelet count 103.  -likely due to alcoholic liver disease.  -monitor daily CBC.  -platelet count 99, CBC otherwise unremarkable.      Hypertension - BPs had been elevated but now with hypotension  -continue home amlodipine, Coreg,  olmesartan (substitute for losartan while inpatient). - stop Losartan  -resumed on home clonidine a few days ago for high BPs, may need to hold also - continue close monitoring of the BP     Mood disorder.  -continue home bupropion  - hold elavil with drowsiness- can resume at dc     BPH.  -continue Flomax.    NANDINI  - labs have been stable, BPs stable but had drop in BP today - now better, keep on IVF, gave bolus also, BPs now better, hold Losartan and clonidine, check for urinary retention.   - Nephrology consulted     Note above makes patient higher risk for morbidity and mortality requiring testing and treatment.     DVT Prophylaxis: lovenox  Out of bed    Management discussed with RN    Erika Joy DO, 7/31/2025 4:32 PM

## 2025-07-31 NOTE — PROGRESS NOTES
Inpatient Occupational Therapy Treatment    Unit: PCU  Date:  7/31/2025  Patient Name:    Franklin Mahmood  Admitting diagnosis:  Alcoholic ketoacidosis [E87.29]  Hypomagnesemia [E83.42]  Wernicke's encephalopathy [E51.2]  Alcohol withdrawal syndrome without complication (HCC) [F10.930]  Admit Date:  7/23/2025  Precautions/Restrictions/WB Status/ Lines/ Wounds/ Oxygen: Fall risk, Bed/chair alarm, Lines (IV and external catheter), No BP/sticks left, and Telesitter - avoid hand/wrist WB on L  Assessment: Left ulnar styloid fracture     Plan:  1) No plans for surgical intervention.  Will order a velcro wrist splint he can wear on the left arm for comfort and support.  Okay for ROM at the wrist and elbow.  Would recommend platform walker if necessary to avoid WB on the wrist for now.  Will plan to see him back in the office in 2 weeks for recheck.    Pt seen for cotreatment this date due to patient safety, patient endurance, complexity of condition, acute illness/injury, and need for the assistance of 2 skilled therapists    Treatment Time:  [id number]  Treatment Number:  3  Timed Code Treatment Minutes:   58 minutes  Total Treatment Minutes:   58 minutes    Patient Goals for Therapy: \"get up\"         Discharge Recommendations: SNF  DME needs for discharge: Defer to facility       Therapy recommendations for staff:   Assist of 2 for transfers with use of DAVID STEDY and gait belt to/from chair; MAXI MOVE if unable to use DAVID STEDY    History of Present Illness:   Per H&P of Elena Kim PA-C 7/23/25  Chief complaint: Alcohol intoxication, + falls, + delusions  Pt reports going to PT for his gait  63 y/o m \"...PMHx of alcohol abuse, hypertension, mood disorder, BPH who presents to [facility] with gait instability and falling at home.  His wife is in the room helping to provide part of the history. This afternoon, he stood up and became extremely lightheaded and collapsed to the ground. It happened again a couple  to Chair TOTAL A with DAVID QUINTERO, gait belt; cues to use R hand on middle of STEDY bar and NWB to LUE    ADLs:  Dressing:      UE:   Not Tested  LE:    Max A adjust socks at EOB    Grooming:  MIN A brush teeth seated; struggles to accurately bring hand to mouth; MIN A needed for accuracy/effective drinking to rinse mouth and MIN A to eat applesauce. Improved from initially needing MOD A once pillow placed at R elbow for support.    Ther Ex / Activities Initiated:   N/A    Positioning Needs:   Pt reclined in chair and alarm set  Call light provided and all needs within reach  RN aware of pt position/status  Telesitter present in room    Patient/Family Education:   Pt educated on role of inpatient OT, plan of care, importance of continued activity, functional transfer/mobility safety, transfer techniques, and calling for assist with mobility    CHF Education  N/A    Assessment:  Pt seen for occupational therapy followup in the acute care setting this date.  Pt limited by fatigue but with good effort throughout. Inquiry placed re: platform walker, patient not quite ready to attempt this date as he is feeling very weak. May be appropriate next session. Pt functioning below baseline and will likely benefit from continued skilled occupational therapy services to maximize safety and independence.     Recommending SNF upon discharge as patient functioning below baseline, demonstrates good rehab potential and unable to return home due to inability to negotiate stairs to enter home/bedroom/bathroom, burden of care beyond caregiver ability, home environment not conducive to patient recovery, and limited safety awareness.    Goal(s) : Ongoing 7/31/25  To be met in 3 Visits:  Bed to toilet/BSC:       Max A , 2 person, and Stand pivot  Pt will complete 3/3 CHF goals     N/A    To be met in 5 Visits:  Supine to/from Sit in preparation for ADL task:   Min A   Toileting        Min A   Grooming       Min A   Upper Body Dressing:

## 2025-07-31 NOTE — FLOWSHEET NOTE
07/31/25 1634   External Urinary Catheter   Placement Date/Time: 07/24/25 0420   Present on Admission/Arrival: No  Insertion Practices: Hand hygiene;Perineal cleansing;Connected to suction   Site Assessment Clean,dry & intact   Output (mL) 300 mL   Urine Assessment   Urinary Status Voiding   $ Bladder scan $ Yes   Post Void Cath Residual (mL) 215       PVR as shown.

## 2025-07-31 NOTE — PLAN OF CARE
Problem: Discharge Planning  Goal: Discharge to home or other facility with appropriate resources  7/30/2025 2137 by Sammie Simms RN  Outcome: Progressing  7/30/2025 0849 by Tiffany Blake RN  Outcome: Progressing  Flowsheets (Taken 7/30/2025 0745)  Discharge to home or other facility with appropriate resources:   Identify barriers to discharge with patient and caregiver   Arrange for needed discharge resources and transportation as appropriate   Identify discharge learning needs (meds, wound care, etc)   Arrange for interpreters to assist at discharge as needed   Refer to discharge planning if patient needs post-hospital services based on physician order or complex needs related to functional status, cognitive ability or social support system     Problem: Seizure Precautions  Goal: Remains free of injury related to seizures activity  7/30/2025 2137 by Sammie Simms RN  Outcome: Progressing  7/30/2025 0849 by Tiffany Blake RN  Outcome: Progressing     Problem: Safety - Adult  Goal: Free from fall injury  7/30/2025 2137 by Sammie Simms RN  Outcome: Progressing  7/30/2025 0849 by Tiffany Blake RN  Outcome: Progressing     Problem: Skin/Tissue Integrity  Goal: Skin integrity remains intact  Description: 1.  Monitor for areas of redness and/or skin breakdown  2.  Assess vascular access sites hourly  3.  Every 4-6 hours minimum:  Change oxygen saturation probe site  4.  Every 4-6 hours:  If on nasal continuous positive airway pressure, respiratory therapy assess nares and determine need for appliance change or resting period  7/30/2025 2137 by Sammie Simms RN  Outcome: Progressing  7/30/2025 0849 by Tiffany Blake RN  Outcome: Progressing  Flowsheets (Taken 7/30/2025 0745)  Skin Integrity Remains Intact:   Monitor for areas of redness and/or skin breakdown   Assess vascular access sites hourly   Every 4-6 hours minimum:  Change oxygen saturation probe site   Every 4-6 hours:  If on nasal  continuous positive airway pressure, assess nares and determine need for appliance change or resting period   Turn and reposition as indicated   Assess need for specialty bed   Positioning devices   Pressure redistribution bed/mattress (bed type)   Check visual cues for pain     Problem: Safety - Medical Restraint  Goal: Remains free of injury from restraints (Restraint for Interference with Medical Device)  Description: INTERVENTIONS:  1. Determine that other, less restrictive measures have been tried or would not be effective before applying the restraint  2. Evaluate the patient's condition at the time of restraint application  3. Inform patient/family regarding the reason for restraint  4. Q2H: Monitor safety, psychosocial status, comfort, nutrition and hydration  7/30/2025 2137 by Sammie Simms, RN  Outcome: Progressing  7/30/2025 0849 by Tiffany Blake RN  Outcome: Progressing     Problem: Pain  Goal: Verbalizes/displays adequate comfort level or baseline comfort level  7/30/2025 2137 by Sammie Simms, RN  Outcome: Progressing  7/30/2025 0849 by Tiffany Blake RN  Outcome: Progressing     Problem: Genitourinary - Adult  Goal: Absence of urinary retention  7/30/2025 2137 by Sammie Simms, RN  Outcome: Progressing  7/30/2025 0849 by Tiffany Blake RN  Outcome: Progressing  Flowsheets (Taken 7/30/2025 0745)  Absence of urinary retention:   Assess patient’s ability to void and empty bladder   Monitor intake/output and perform bladder scan as needed   Place urinary catheter per Licensed Independent Practitioner order if needed   Discuss with Licensed Independent Practitioner  medications to alleviate retention as needed   Discuss catheterization for long term situations as appropriate

## 2025-08-01 LAB
ANION GAP SERPL CALCULATED.3IONS-SCNC: 13 MMOL/L (ref 3–16)
BASOPHILS # BLD: 0.1 K/UL (ref 0–0.2)
BASOPHILS NFR BLD: 0.9 %
BILIRUB UR QL STRIP.AUTO: NEGATIVE
BUN SERPL-MCNC: 15 MG/DL (ref 7–20)
CALCIUM SERPL-MCNC: 9.2 MG/DL (ref 8.3–10.6)
CHLORIDE SERPL-SCNC: 108 MMOL/L (ref 99–110)
CLARITY UR: CLEAR
CO2 SERPL-SCNC: 23 MMOL/L (ref 21–32)
COLOR UR: YELLOW
CREAT SERPL-MCNC: 1.3 MG/DL (ref 0.8–1.3)
DEPRECATED RDW RBC AUTO: 14.3 % (ref 12.4–15.4)
EOSINOPHIL # BLD: 0.1 K/UL (ref 0–0.6)
EOSINOPHIL NFR BLD: 2.4 %
EPI CELLS #/AREA URNS HPF: NORMAL /HPF (ref 0–5)
GFR SERPLBLD CREATININE-BSD FMLA CKD-EPI: 61 ML/MIN/{1.73_M2}
GLUCOSE BLD-MCNC: 259 MG/DL (ref 70–99)
GLUCOSE SERPL-MCNC: 157 MG/DL (ref 70–99)
GLUCOSE UR STRIP.AUTO-MCNC: NEGATIVE MG/DL
HCT VFR BLD AUTO: 37.9 % (ref 40.5–52.5)
HGB BLD-MCNC: 12.5 G/DL (ref 13.5–17.5)
HGB UR QL STRIP.AUTO: NEGATIVE
KETONES UR STRIP.AUTO-MCNC: NEGATIVE MG/DL
LEUKOCYTE ESTERASE UR QL STRIP.AUTO: NEGATIVE
LYMPHOCYTES # BLD: 0.9 K/UL (ref 1–5.1)
LYMPHOCYTES NFR BLD: 15.5 %
MAGNESIUM SERPL-MCNC: 1.58 MG/DL (ref 1.8–2.4)
MCH RBC QN AUTO: 30.2 PG (ref 26–34)
MCHC RBC AUTO-ENTMCNC: 33 G/DL (ref 31–36)
MCV RBC AUTO: 91.4 FL (ref 80–100)
MONOCYTES # BLD: 0.7 K/UL (ref 0–1.3)
MONOCYTES NFR BLD: 11.9 %
NEUTROPHILS # BLD: 4.1 K/UL (ref 1.7–7.7)
NEUTROPHILS NFR BLD: 69.3 %
NITRITE UR QL STRIP.AUTO: NEGATIVE
PERFORMED ON: ABNORMAL
PH UR STRIP.AUTO: 6 [PH] (ref 5–8)
PLATELET # BLD AUTO: 227 K/UL (ref 135–450)
PMV BLD AUTO: 8.4 FL (ref 5–10.5)
POTASSIUM SERPL-SCNC: 3.5 MMOL/L (ref 3.5–5.1)
PROT UR STRIP.AUTO-MCNC: NEGATIVE MG/DL
RBC # BLD AUTO: 4.14 M/UL (ref 4.2–5.9)
RBC #/AREA URNS HPF: NORMAL /HPF (ref 0–4)
REASON FOR REJECTION: NORMAL
REJECTED TEST: NORMAL
SODIUM SERPL-SCNC: 144 MMOL/L (ref 136–145)
SP GR UR STRIP.AUTO: 1.01 (ref 1–1.03)
UA DIPSTICK W REFLEX MICRO PNL UR: NORMAL
URN SPEC COLLECT METH UR: NORMAL
UROBILINOGEN UR STRIP-ACNC: 1 E.U./DL
WBC # BLD AUTO: 5.9 K/UL (ref 4–11)
WBC #/AREA URNS HPF: NORMAL /HPF (ref 0–5)

## 2025-08-01 PROCEDURE — 6360000002 HC RX W HCPCS

## 2025-08-01 PROCEDURE — 99232 SBSQ HOSP IP/OBS MODERATE 35: CPT | Performed by: INTERNAL MEDICINE

## 2025-08-01 PROCEDURE — 2500000003 HC RX 250 WO HCPCS

## 2025-08-01 PROCEDURE — 6360000002 HC RX W HCPCS: Performed by: INTERNAL MEDICINE

## 2025-08-01 PROCEDURE — 2580000003 HC RX 258

## 2025-08-01 PROCEDURE — 6370000000 HC RX 637 (ALT 250 FOR IP)

## 2025-08-01 PROCEDURE — 36415 COLL VENOUS BLD VENIPUNCTURE: CPT

## 2025-08-01 PROCEDURE — 80048 BASIC METABOLIC PNL TOTAL CA: CPT

## 2025-08-01 PROCEDURE — 81001 URINALYSIS AUTO W/SCOPE: CPT

## 2025-08-01 PROCEDURE — 2060000000 HC ICU INTERMEDIATE R&B

## 2025-08-01 PROCEDURE — 2580000003 HC RX 258: Performed by: INTERNAL MEDICINE

## 2025-08-01 PROCEDURE — 6370000000 HC RX 637 (ALT 250 FOR IP): Performed by: INTERNAL MEDICINE

## 2025-08-01 PROCEDURE — 83735 ASSAY OF MAGNESIUM: CPT

## 2025-08-01 PROCEDURE — 85025 COMPLETE CBC W/AUTO DIFF WBC: CPT

## 2025-08-01 RX ADMIN — ASPIRIN 81 MG: 81 TABLET, COATED ORAL at 09:13

## 2025-08-01 RX ADMIN — ENOXAPARIN SODIUM 30 MG: 100 INJECTION SUBCUTANEOUS at 20:26

## 2025-08-01 RX ADMIN — LABETALOL HYDROCHLORIDE 10 MG: 5 INJECTION, SOLUTION INTRAVENOUS at 17:05

## 2025-08-01 RX ADMIN — Medication 100 MG: at 09:14

## 2025-08-01 RX ADMIN — FOLIC ACID 1 MG: 1 TABLET ORAL at 09:14

## 2025-08-01 RX ADMIN — SODIUM CHLORIDE: 0.9 INJECTION, SOLUTION INTRAVENOUS at 13:22

## 2025-08-01 RX ADMIN — LABETALOL HYDROCHLORIDE 10 MG: 5 INJECTION, SOLUTION INTRAVENOUS at 02:18

## 2025-08-01 RX ADMIN — SODIUM CHLORIDE: 0.9 INJECTION, SOLUTION INTRAVENOUS at 02:15

## 2025-08-01 RX ADMIN — BUPROPION HYDROCHLORIDE 150 MG: 150 TABLET, EXTENDED RELEASE ORAL at 09:13

## 2025-08-01 RX ADMIN — CARVEDILOL 6.25 MG: 6.25 TABLET, FILM COATED ORAL at 09:14

## 2025-08-01 RX ADMIN — ENOXAPARIN SODIUM 30 MG: 100 INJECTION SUBCUTANEOUS at 09:14

## 2025-08-01 RX ADMIN — SODIUM CHLORIDE, PRESERVATIVE FREE 10 ML: 5 INJECTION INTRAVENOUS at 20:26

## 2025-08-01 RX ADMIN — CARVEDILOL 6.25 MG: 6.25 TABLET, FILM COATED ORAL at 17:05

## 2025-08-01 RX ADMIN — SODIUM CHLORIDE, PRESERVATIVE FREE 10 ML: 5 INJECTION INTRAVENOUS at 09:15

## 2025-08-01 RX ADMIN — TAMSULOSIN HYDROCHLORIDE 0.4 MG: 0.4 CAPSULE ORAL at 20:26

## 2025-08-01 RX ADMIN — SODIUM CHLORIDE: 0.9 INJECTION, SOLUTION INTRAVENOUS at 11:19

## 2025-08-01 RX ADMIN — AMLODIPINE BESYLATE 5 MG: 5 TABLET ORAL at 09:13

## 2025-08-01 RX ADMIN — MAGNESIUM SULFATE HEPTAHYDRATE 2000 MG: 40 INJECTION, SOLUTION INTRAVENOUS at 13:23

## 2025-08-01 RX ADMIN — THERA TABS 1 TABLET: TAB at 09:14

## 2025-08-01 RX ADMIN — ACETAMINOPHEN 650 MG: 325 TABLET ORAL at 21:54

## 2025-08-01 RX ADMIN — CLONIDINE HYDROCHLORIDE 0.1 MG: 0.1 TABLET ORAL at 20:26

## 2025-08-01 RX ADMIN — POLYETHYLENE GLYCOL (3350) 17 G: 17 POWDER, FOR SOLUTION ORAL at 17:05

## 2025-08-01 RX ADMIN — SODIUM CHLORIDE, PRESERVATIVE FREE 10 ML: 5 INJECTION INTRAVENOUS at 09:14

## 2025-08-01 ASSESSMENT — PAIN SCALES - GENERAL
PAINLEVEL_OUTOF10: 7
PAINLEVEL_OUTOF10: 3

## 2025-08-01 ASSESSMENT — PAIN SCALES - WONG BAKER: WONGBAKER_NUMERICALRESPONSE: NO HURT

## 2025-08-01 ASSESSMENT — PAIN DESCRIPTION - ORIENTATION: ORIENTATION: LEFT

## 2025-08-01 ASSESSMENT — PAIN DESCRIPTION - DESCRIPTORS: DESCRIPTORS: ACHING

## 2025-08-01 ASSESSMENT — PAIN DESCRIPTION - LOCATION: LOCATION: KNEE;ANKLE

## 2025-08-01 NOTE — FLOWSHEET NOTE
08/01/25 1204   Vital Signs   Temp 98.5 °F (36.9 °C)   Temp Source Axillary   Pulse 85   Heart Rate Source Monitor   Respirations 16   BP (!) 153/104   MAP (Calculated) 120   BP Location Right lower arm   BP Method Automatic   Patient Position Semi fowlers     Assessment & vital signs completed. Pt denies any further needs at this time. Call light within reach, pt is stable.

## 2025-08-01 NOTE — PROGRESS NOTES
[facility] Progress Note    Daily Progress Note for 2025 2:54 PM /0307-01  Franklin Mahmood : 1960 Age: 64 y.o. Sex: male  Length of Stay:  9    Interval History:      CC: F/U Alcohol Intoxication (Pt arrives via EMS with alcohol withdrawal, falls, and having delusions. EMS reports pt had 2 falls for them (no LOC -Blood thinners). Pt states he has been going to PT for his gait. Pt drinks a bottle of whiskey every 2-3 days. Last drink @ 1700 yesterday )    Subjective:       Reports he is doing ok, says he is eating. No n/v or diarrhea per patient.    Objective:     Vitals:    25 0217 25 0331 25 0714 25 1204   BP: (!) 167/101 (!) 153/96 (!) 172/108 (!) 153/104   Pulse: 85 88 87 85   Resp: 16 16 16 16   Temp: 98.3 °F (36.8 °C) 98.5 °F (36.9 °C)  98.5 °F (36.9 °C)   TempSrc: Oral Oral  Axillary   SpO2: 95% 96% 96% 95%   Weight:       Height:              Intake/Output Summary (Last 24 hours) at 2025 1454  Last data filed at 2025 1258  Gross per 24 hour   Intake 984.72 ml   Output 1815 ml   Net -830.28 ml     Body mass index is 28.88 kg/m².    Physical Exam:    General:  middle aged male up in bed, remains alert and oriented. Appears to be not in any distress  Mucous Membranes:  Pink , anicteric  Neck: No JVD, no carotid bruit, no thyromegaly  Chest:  Clear to auscultation bilaterally, no added sounds  Cardiovascular:  RRR S1S2 heard, no murmurs or gallops  Abdomen:  Soft, undistended, non tender, no organomegaly, BS present  Extremities: left wrist brace noted   No edema or cyanosis. Distal pulses well felt  Neurological : grossly normal with no clear withdrawal symptoms  No hallucinations today , no focal findings    Scheduled Medications:  cloNIDine, 0.1 mg, BID  thiamine, 100 mg, Daily  sodium chloride flush, 5-40 mL, 2 times per day  multivitamin, 1 tablet, Daily  folic acid, 1 mg, Daily  sodium chloride flush, 5-40 mL, 2 times per day  enoxaparin, 30

## 2025-08-01 NOTE — PLAN OF CARE
Problem: Seizure Precautions  Goal: Remains free of injury related to seizures activity  8/1/2025 1017 by Julio Cesar Liriano RN  Flowsheets (Taken 7/29/2025 0925 by Tiffany Blake RN)  Remains free of injury related to seizure activity:   Maintain airway, patient safety  and administer oxygen as ordered   Monitor patient for seizure activity, document and report duration and description of seizure to Licensed Independent Practitioner   If seizure occurs, turn patient to side and suction secretions as needed   Reorient patient post seizure   Seizure pads on all 4 side rails   Instruct patient/family to notify RN of any seizure activity   Instruct patient/family to call for assistance with activity based on assessment  7/31/2025 2305 by Sammie Simms RN  Outcome: Progressing     Problem: Pain  Goal: Verbalizes/displays adequate comfort level or baseline comfort level  8/1/2025 1017 by Julio Cesar Liriano RN  Flowsheets (Taken 8/1/2025 1017)  Verbalizes/displays adequate comfort level or baseline comfort level:   Encourage patient to monitor pain and request assistance   Assess pain using appropriate pain scale  Note: Pain 0/10.   7/31/2025 2305 by Sammie Simms, RN  Outcome: Progressing

## 2025-08-01 NOTE — FLOWSHEET NOTE
08/01/25 1903   Handoff   Communication Given Shift Handoff   Handoff Given To JUDY Beltrán   Handoff Received From JUDY Roy   Handoff Communication Face to Face   Time Handoff Given 1903   End of Shift Check Performed Yes     EOS report given to JUDY Beltrán. Pt in bed, call light within reach. Pt is stable, care is transferred.

## 2025-08-01 NOTE — CONSULTS
[facility]  Nephrology Consult Note           Reason for Consult: NANDINI  Requesting Physician:  Dr. Joy    Chief Complaint:    Chief Complaint   Patient presents with    Alcohol Intoxication     Pt arrives via EMS with alcohol withdrawal, falls, and having delusions. EMS reports pt had 2 falls for them (no LOC -Blood thinners). Pt states he has been going to PT for his gait. Pt drinks a bottle of whiskey every 2-3 days. Last drink @ 1700 yesterday      History of Present Illness on 8/1/2025:    64 y.o. yo male with PMH of alcohol abuse, hypertension, BPH, mood disorder who is admitted for alcohol withdrawal on 7/23/2025.  Patient presented with falls and symptoms of alcohol withdrawal with delusions.  He drinks a bottle of whiskey every 2 to 3 days  Patient reports that he has been having recurrent falls for last 2 to 3 years  Patient's creatinine increased from 0.8 to 2 on 7/31 which later increased to 2.3 and nephrology was consulted.  His blood pressure had dropped from 140s to 70s and 80s on 7/31 after which multiple antihypertensives were held, IV fluid was given and patient's blood pressure has now increased.  Creatinine has improved to 1.3 on 8/1    Past Medical History:        Diagnosis Date    Acid reflux     Alcohol abuse     Hyperlipidemia     Hypertension     Vertigo        Past Surgical History:        Procedure Laterality Date    COLONOSCOPY  10/06/2015    divert    HERNIA REPAIR      PRE-MALIGNANT / BENIGN SKIN LESION EXCISION      Lump removed from under left arm    WISDOM TOOTH EXTRACTION      2 extracted and still has 2       Home Medications:    No current facility-administered medications on file prior to encounter.     Current Outpatient Medications on File Prior to Encounter   Medication Sig Dispense Refill    naltrexone (DEPADE) 50 MG tablet Take 2 tablets by mouth daily      tadalafil (CIALIS) 20 MG tablet Take 1 tablet by mouth as needed for Erectile Dysfunction      vitamin D  Vital Sign     Worried About Running Out of Food in the Last Year: Never true     Ran Out of Food in the Last Year: Sometimes true   Transportation Needs: No Transportation Needs (7/23/2025)    PRAPARE - Transportation     Lack of Transportation (Medical): No     Lack of Transportation (Non-Medical): No   Physical Activity: Not on file   Stress: Not on file   Social Connections: Not on file   Intimate Partner Violence: Not on file   Housing Stability: Low Risk  (7/23/2025)    Housing Stability Vital Sign     Unable to Pay for Housing in the Last Year: No     Number of Times Moved in the Last Year: 0     Homeless in the Last Year: No       Family History:   Family History   Problem Relation Age of Onset    Diabetes Mother     Stroke Father     Heart Disease Father     Cancer Sister     Diabetes Sister        Review of Systems:   Pertinent positives stated above in HPI. All other 10 systems were reviewed and were negative.     Physical exam:   Constitutional:  VITALS:  BP (!) 153/104   Pulse 85   Temp 98.5 °F (36.9 °C) (Axillary)   Resp 16   Ht 1.88 m (6' 2.02\")   Wt 102.1 kg (225 lb)   SpO2 95%   BMI 28.88 kg/m²   Gen: alert, awake  Neck: No JVD  Skin: Unremarkable  Cardiovascular:  S1, S2 without m/r/g   Respiratory: CTA B without w/r/r; respiratory effort normal  Abdomen:  soft, nt, nd,   Extremities: no lower extremity edema  Neuro/Psy: AAoriented times 3 ; moves all 4 ext    Data/  Recent Labs     08/01/25  0540   WBC 5.9   HGB 12.5*   HCT 37.9*   MCV 91.4        Recent Labs     07/30/25  0734 07/31/25  1200 07/31/25  1407 08/01/25  0442    140 139 144   K 3.0* 3.8 3.7 3.5    103 102 108   CO2 25 24 23 23   GLUCOSE 110* 153* 173* 157*   MG 1.58* 1.90  --  1.58*   BUN 9 17 19 15   CREATININE 0.8 2.0* 2.3* 1.3   LABGLOM >90 37* 31* 61       Assessment  -NANDINI likely related to hypotension in the setting of losartan use.  Has improved significantly with fluid    Creatinine peaked at 2.3 and

## 2025-08-01 NOTE — PLAN OF CARE
Problem: Discharge Planning  Goal: Discharge to home or other facility with appropriate resources  7/31/2025 2305 by Sammie Simms RN  Outcome: Progressing  7/31/2025 0921 by Alonso Feldman RN  Outcome: Progressing  Flowsheets (Taken 7/31/2025 0916)  Discharge to home or other facility with appropriate resources: Identify barriers to discharge with patient and caregiver     Problem: Seizure Precautions  Goal: Remains free of injury related to seizures activity  7/31/2025 2305 by Sammie Simms RN  Outcome: Progressing  7/31/2025 0921 by Alonso Feldman RN  Outcome: Progressing     Problem: Safety - Adult  Goal: Free from fall injury  7/31/2025 2305 by Sammie Simms RN  Outcome: Progressing  7/31/2025 0921 by Alonso Feldman RN  Outcome: Progressing     Problem: Skin/Tissue Integrity  Goal: Skin integrity remains intact  Description: 1.  Monitor for areas of redness and/or skin breakdown  2.  Assess vascular access sites hourly  3.  Every 4-6 hours minimum:  Change oxygen saturation probe site  4.  Every 4-6 hours:  If on nasal continuous positive airway pressure, respiratory therapy assess nares and determine need for appliance change or resting period  7/31/2025 2305 by Sammie Simms RN  Outcome: Progressing  7/31/2025 0921 by Alonso Feldman RN  Outcome: Progressing  Flowsheets (Taken 7/31/2025 0916)  Skin Integrity Remains Intact: Monitor for areas of redness and/or skin breakdown     Problem: Safety - Medical Restraint  Goal: Remains free of injury from restraints (Restraint for Interference with Medical Device)  Description: INTERVENTIONS:  1. Determine that other, less restrictive measures have been tried or would not be effective before applying the restraint  2. Evaluate the patient's condition at the time of restraint application  3. Inform patient/family regarding the reason for restraint  4. Q2H: Monitor safety, psychosocial status, comfort, nutrition and

## 2025-08-02 VITALS
SYSTOLIC BLOOD PRESSURE: 133 MMHG | TEMPERATURE: 97.4 F | WEIGHT: 225 LBS | HEIGHT: 74 IN | DIASTOLIC BLOOD PRESSURE: 88 MMHG | OXYGEN SATURATION: 97 % | RESPIRATION RATE: 18 BRPM | BODY MASS INDEX: 28.88 KG/M2 | HEART RATE: 79 BPM

## 2025-08-02 LAB
ANION GAP SERPL CALCULATED.3IONS-SCNC: 14 MMOL/L (ref 3–16)
BASOPHILS # BLD: 0 K/UL (ref 0–0.2)
BASOPHILS NFR BLD: 1 %
BUN SERPL-MCNC: 8 MG/DL (ref 7–20)
CALCIUM SERPL-MCNC: 9.5 MG/DL (ref 8.3–10.6)
CHLORIDE SERPL-SCNC: 105 MMOL/L (ref 99–110)
CO2 SERPL-SCNC: 23 MMOL/L (ref 21–32)
CREAT SERPL-MCNC: 0.8 MG/DL (ref 0.8–1.3)
DEPRECATED RDW RBC AUTO: 14.2 % (ref 12.4–15.4)
EOSINOPHIL # BLD: 0.1 K/UL (ref 0–0.6)
EOSINOPHIL NFR BLD: 3.1 %
GFR SERPLBLD CREATININE-BSD FMLA CKD-EPI: >90 ML/MIN/{1.73_M2}
GLUCOSE SERPL-MCNC: 114 MG/DL (ref 70–99)
HCT VFR BLD AUTO: 38.1 % (ref 40.5–52.5)
HGB BLD-MCNC: 12.7 G/DL (ref 13.5–17.5)
LYMPHOCYTES # BLD: 1 K/UL (ref 1–5.1)
LYMPHOCYTES NFR BLD: 21 %
MAGNESIUM SERPL-MCNC: 1.47 MG/DL (ref 1.8–2.4)
MCH RBC QN AUTO: 30.4 PG (ref 26–34)
MCHC RBC AUTO-ENTMCNC: 33.3 G/DL (ref 31–36)
MCV RBC AUTO: 91.2 FL (ref 80–100)
MONOCYTES # BLD: 0.5 K/UL (ref 0–1.3)
MONOCYTES NFR BLD: 10.2 %
NEUTROPHILS # BLD: 3.1 K/UL (ref 1.7–7.7)
NEUTROPHILS NFR BLD: 64.7 %
PLATELET # BLD AUTO: 246 K/UL (ref 135–450)
PMV BLD AUTO: 8.3 FL (ref 5–10.5)
POTASSIUM SERPL-SCNC: 3.2 MMOL/L (ref 3.5–5.1)
RBC # BLD AUTO: 4.18 M/UL (ref 4.2–5.9)
SODIUM SERPL-SCNC: 142 MMOL/L (ref 136–145)
WBC # BLD AUTO: 4.8 K/UL (ref 4–11)

## 2025-08-02 PROCEDURE — 85025 COMPLETE CBC W/AUTO DIFF WBC: CPT

## 2025-08-02 PROCEDURE — 6370000000 HC RX 637 (ALT 250 FOR IP)

## 2025-08-02 PROCEDURE — 99238 HOSP IP/OBS DSCHRG MGMT 30/<: CPT | Performed by: INTERNAL MEDICINE

## 2025-08-02 PROCEDURE — 6360000002 HC RX W HCPCS

## 2025-08-02 PROCEDURE — 6370000000 HC RX 637 (ALT 250 FOR IP): Performed by: INTERNAL MEDICINE

## 2025-08-02 PROCEDURE — 80048 BASIC METABOLIC PNL TOTAL CA: CPT

## 2025-08-02 PROCEDURE — 36415 COLL VENOUS BLD VENIPUNCTURE: CPT

## 2025-08-02 PROCEDURE — 6360000002 HC RX W HCPCS: Performed by: INTERNAL MEDICINE

## 2025-08-02 PROCEDURE — 83735 ASSAY OF MAGNESIUM: CPT

## 2025-08-02 PROCEDURE — 2500000003 HC RX 250 WO HCPCS

## 2025-08-02 RX ORDER — LOSARTAN POTASSIUM 25 MG/1
25 TABLET ORAL DAILY
DISCHARGE
Start: 2025-08-02

## 2025-08-02 RX ORDER — POTASSIUM CHLORIDE 1500 MG/1
20 TABLET, EXTENDED RELEASE ORAL DAILY
DISCHARGE
Start: 2025-08-03 | End: 2025-08-10

## 2025-08-02 RX ORDER — POTASSIUM CHLORIDE 7.45 MG/ML
10 INJECTION INTRAVENOUS PRN
Status: DISCONTINUED | OUTPATIENT
Start: 2025-08-02 | End: 2025-08-02 | Stop reason: HOSPADM

## 2025-08-02 RX ORDER — POTASSIUM CHLORIDE 1500 MG/1
40 TABLET, EXTENDED RELEASE ORAL PRN
Status: DISCONTINUED | OUTPATIENT
Start: 2025-08-02 | End: 2025-08-02 | Stop reason: HOSPADM

## 2025-08-02 RX ORDER — LOSARTAN POTASSIUM 25 MG/1
25 TABLET ORAL DAILY
Status: DISCONTINUED | OUTPATIENT
Start: 2025-08-02 | End: 2025-08-02 | Stop reason: HOSPADM

## 2025-08-02 RX ORDER — HYDRALAZINE HYDROCHLORIDE 25 MG/1
25 TABLET, FILM COATED ORAL 3 TIMES DAILY PRN
DISCHARGE
Start: 2025-08-02

## 2025-08-02 RX ORDER — CLONIDINE HYDROCHLORIDE 0.1 MG/1
0.1 TABLET ORAL 2 TIMES DAILY
DISCHARGE
Start: 2025-08-02

## 2025-08-02 RX ORDER — MAGNESIUM SULFATE IN WATER 40 MG/ML
2000 INJECTION, SOLUTION INTRAVENOUS ONCE
Status: COMPLETED | OUTPATIENT
Start: 2025-08-02 | End: 2025-08-02

## 2025-08-02 RX ADMIN — THERA TABS 1 TABLET: TAB at 07:50

## 2025-08-02 RX ADMIN — LOSARTAN POTASSIUM 25 MG: 25 TABLET, FILM COATED ORAL at 09:21

## 2025-08-02 RX ADMIN — MAGNESIUM SULFATE HEPTAHYDRATE 2000 MG: 40 INJECTION, SOLUTION INTRAVENOUS at 10:59

## 2025-08-02 RX ADMIN — POTASSIUM CHLORIDE 40 MEQ: 1500 TABLET, EXTENDED RELEASE ORAL at 09:21

## 2025-08-02 RX ADMIN — BUPROPION HYDROCHLORIDE 150 MG: 150 TABLET, EXTENDED RELEASE ORAL at 07:50

## 2025-08-02 RX ADMIN — SODIUM CHLORIDE, PRESERVATIVE FREE 10 ML: 5 INJECTION INTRAVENOUS at 07:51

## 2025-08-02 RX ADMIN — Medication 100 MG: at 07:50

## 2025-08-02 RX ADMIN — CLONIDINE HYDROCHLORIDE 0.1 MG: 0.1 TABLET ORAL at 07:50

## 2025-08-02 RX ADMIN — AMLODIPINE BESYLATE 5 MG: 5 TABLET ORAL at 07:50

## 2025-08-02 RX ADMIN — ACETAMINOPHEN 650 MG: 325 TABLET ORAL at 07:50

## 2025-08-02 RX ADMIN — CARVEDILOL 6.25 MG: 6.25 TABLET, FILM COATED ORAL at 07:50

## 2025-08-02 RX ADMIN — ASPIRIN 81 MG: 81 TABLET, COATED ORAL at 07:50

## 2025-08-02 RX ADMIN — ENOXAPARIN SODIUM 30 MG: 100 INJECTION SUBCUTANEOUS at 07:49

## 2025-08-02 RX ADMIN — FOLIC ACID 1 MG: 1 TABLET ORAL at 07:50

## 2025-08-02 ASSESSMENT — PAIN DESCRIPTION - LOCATION: LOCATION: WRIST

## 2025-08-02 ASSESSMENT — PAIN SCALES - GENERAL: PAINLEVEL_OUTOF10: 7

## 2025-08-02 ASSESSMENT — PAIN DESCRIPTION - ORIENTATION: ORIENTATION: LEFT

## 2025-08-02 NOTE — PROGRESS NOTES
[email or web address]  Nephrology Follow up Note           Reason for Consult: NANDINI  Requesting Physician:  Dr. Joy    Sub/interval history  Resting  BP trending down    Last 24 h uop 2.5l     ROS: No chest pain/shortness of breath/fever/nausea/vomiting  PSFH: No visitor    Scheduled Meds:   losartan  25 mg Oral Daily    cloNIDine  0.1 mg Oral BID    thiamine  100 mg Oral Daily    sodium chloride flush  5-40 mL IntraVENous 2 times per day    multivitamin  1 tablet Oral Daily    folic acid  1 mg Oral Daily    sodium chloride flush  5-40 mL IntraVENous 2 times per day    enoxaparin  30 mg SubCUTAneous BID    amLODIPine  5 mg Oral Daily    aspirin  81 mg Oral Daily    buPROPion  150 mg Oral Daily    carvedilol  6.25 mg Oral BID WC    tamsulosin  0.4 mg Oral Nightly     Continuous Infusions:   sodium chloride Stopped (08/02/25 0950)     PRN Meds:.potassium chloride **OR** potassium alternative oral replacement **OR** potassium chloride, labetalol, magnesium sulfate, sodium phosphate 15 mmol in sodium chloride 0.9 % 250 mL IVPB, sodium chloride flush, sodium chloride, sodium chloride flush, ondansetron **OR** ondansetron, polyethylene glycol, acetaminophen **OR** acetaminophen    History of Present Illness on 8/1/2025:    64 y.o. yo male with PMH of alcohol abuse, hypertension, BPH, mood disorder who is admitted for alcohol withdrawal on 7/23/2025.  Patient presented with falls and symptoms of alcohol withdrawal with delusions.  He drinks a bottle of whiskey every 2 to 3 days  Patient reports that he has been having recurrent falls for last 2 to 3 years  Patient's creatinine increased from 0.8 to 2 on 7/31 which later increased to 2.3 and nephrology was consulted.  His blood pressure had dropped from 140s to 70s and 80s on 7/31 after which multiple antihypertensives were held, IV fluid was given and patient's blood pressure has now increased.  Creatinine has improved to 1.3 on 8/1    Physical exam:   Constitutional:  VITALS:

## 2025-08-02 NOTE — PROGRESS NOTES
Patient taken off the floor by ambulance transport to [facility].  He had all of his personals with him.

## 2025-08-02 NOTE — PROGRESS NOTES
Bedside report and transfer of care given to Evelyn UMANZOR RN. Pt currently resting in bed with the call light within reach. Pt denies any other care needs at this time. Pt stable at this time.

## 2025-08-02 NOTE — CARE COORDINATION
DISCHARGE ORDER  Date/Time 2025 8:25 AM  Completed by: Anita Trimble RN, Case Management    Patient Name: Franklin Mahmood    : 1960      Admit order Date and Status:25 IP  Noted discharge order. (verify MD's last order for status of admission/Traditional Medicare 3 [place] Inpatient qualifying stay required for SNF)    Confirmed discharge plan with:              Patient:  Yes              When pt confirms DC plan does any support person need to be contacted by CM Yes spouse Annalisa                  Discharge to Facility: [facility]   Facility phone number for staff giving report: [phone]   Pre-certification completed: yes. Able to accept today   Hospital Exemption Notification (HENS) completed: yes   Discharge orders and Continuity of Care faxed to facility:  [facility]      Transportation:               Medical Transport explained with choice list offered to pt/family.                Choice:(no preference)  Agency used: [facility]   time:   1300      Pt/family/Nursing/Facility aware of  time: 1300  Yes Names: EmiliatjFranklin doan, GIULIA Rivas Rosa  Ambulance form completed:  yes:      Date Last IMM Given: na    Comments:    Pt is being d/c'd to [facility] today. Pt's O2 sats are 96% on RA.    Discharge timeout done with Pt,RN,CM. All discharge needs and concerns addressed.    Discharging nurse to complete QUINTON, reconcile AVS, and place final copy with patient's discharge packet. Discharging RN to ensure that written prescriptions for  Level II medications are sent with patient to the facility as per protocol.

## 2025-08-02 NOTE — PLAN OF CARE
Problem: Discharge Planning  Goal: Discharge to home or other facility with appropriate resources  Outcome: Progressing     Problem: Seizure Precautions  Goal: Remains free of injury related to seizures activity  8/1/2025 2354 by Leigh Ann Le, RN  Outcome: Progressing  8/1/2025 1017 by Julio Cesar Liriano, RN  Flowsheets (Taken 7/29/2025 0984 by Tiffany Blake, RN)  Remains free of injury related to seizure activity:   Maintain airway, patient safety  and administer oxygen as ordered   Monitor patient for seizure activity, document and report duration and description of seizure to Licensed Independent Practitioner   If seizure occurs, turn patient to side and suction secretions as needed   Reorient patient post seizure   Seizure pads on all 4 side rails   Instruct patient/family to notify RN of any seizure activity   Instruct patient/family to call for assistance with activity based on assessment     Problem: Safety - Adult  Goal: Free from fall injury  Outcome: Progressing     Problem: Skin/Tissue Integrity  Goal: Skin integrity remains intact  Description: 1.  Monitor for areas of redness and/or skin breakdown  2.  Assess vascular access sites hourly  3.  Every 4-6 hours minimum:  Change oxygen saturation probe site  4.  Every 4-6 hours:  If on nasal continuous positive airway pressure, respiratory therapy assess nares and determine need for appliance change or resting period  Outcome: Progressing     Problem: Safety - Medical Restraint  Goal: Remains free of injury from restraints (Restraint for Interference with Medical Device)  Description: INTERVENTIONS:  1. Determine that other, less restrictive measures have been tried or would not be effective before applying the restraint  2. Evaluate the patient's condition at the time of restraint application  3. Inform patient/family regarding the reason for restraint  4. Q2H: Monitor safety, psychosocial status, comfort, nutrition and hydration  Outcome:

## 2025-08-02 NOTE — PROGRESS NOTES
Latest Reference Range & Units 08/02/25 04:53   Magnesium 1.80 - 2.40 mg/dL 1.47 (L)   (L): Data is abnormally low    Given 2g Mag IV

## 2025-08-02 NOTE — DISCHARGE SUMMARY
Name:  Franklin Mahmood  Room:  /0307-01  MRN:    [id number]    IM Discharge Summary    Discharging Physician:  Azar ortiz MD    Admit: 7/23/2025    Discharge:      PCP      Thea Schuler    Diagnoses and hospital course  this Admission        Alcohol abuse with acute withdrawal  -reports drinking a bottle of whiskey q2-3 days, last drink 1500 on 7/22.  -EtOH level: undetectable on adm  -fall and seizure precautions.  -SARAY Librium   -CIWA protocol with PRN Ativan/Valium.  -started multivitamin, folate, and thiamine.  -IVF support given   - cessation recommended and pt willing  -CM for resources.      Concern for Wernicke's encephalopathy  -confusion, speech changes, dizziness, gait abnormality/falls.  -reportedly going to PT outpatient for gait.  -CT head & C-spine - nonacute.  -fall precautions.  -PT/OT.  -MRI ordered - No acute infarct. Mild generalized parenchymal atrophy and mild white matter signal abnormality, suggestive of chronic small vessel disease   -neurology consulted.  -initiated high-dose IV Thiamine, now on PO.   -ammonia neg  - improved symptoms and now mentation back to normal. No more withdrawal symptoms   -PT/OT recommending SNF.     Ulnar styloid fracture, left  - Wife reports he crashed into table(s)and falls  which were broken so this was likely POA  - Ortho consulted  - apply ice, pain control pending further recs.   -wrist splint ordered,         Hypokalemia. - severe -resolved  Hypomagnesemia- resolved.  -K 3.0, mag 1.04 on admission.  -replacement ordered.  -continue to monitor and replete PRN.        Transaminitis.  -alk phos 165, ALT 63, AST 77, total bili 1.5.  -likely in the setting of alcohol abuse.  -monitor daily LFTs.  - hepatitis panel neg - neg  - RUQ US - diffuse hepatic steatosis  - INR normal  - improving slowly      Pancytopenia.  -WBC 2.5, hemoglobin 13.3, platelet count 103.  -likely due to alcoholic liver disease.  -monitor daily CBC.  -platelet count 99, CBC 
gray-white differentiation is maintained without evidence of an acute infarct.  There is no evidence of hydrocephalus. There is stable mild volume loss.  Intracranial atherosclerosis is present. ORBITS: The visualized portion of the orbits demonstrate no acute abnormality. SINUSES: The visualized paranasal sinuses and mastoid air cells demonstrate no acute abnormality. SOFT TISSUES/SKULL:  No acute abnormality of the visualized skull or soft tissues. CERVICAL SPINE BONES/ALIGNMENT: There is no acute fracture or traumatic malalignment. DEGENERATIVE CHANGES: There are multilevel degenerative changes of the cervical spine with bulky anterior osteophytes.  There is a chronically fractured anterior osteophyte of C6-C7.  There is no severe spinal canal stenosis. SOFT TISSUES: There is no prevertebral soft tissue swelling.     1. No acute intracranial abnormality. 2. No acute osseous abnormality of the cervical spine.     CT Head W/O Contrast  Result Date: 7/23/2025  EXAMINATION: CT OF THE HEAD WITHOUT CONTRAST; CT OF THE CERVICAL SPINE WITHOUT CONTRAST 7/23/2025 2:10 pm TECHNIQUE: CT of the head was performed without the administration of intravenous contrast. Automated exposure control, iterative reconstruction, and/or weight based adjustment of the mA/kV was utilized to reduce the radiation dose to as low as reasonably achievable.; CT of the cervical spine was performed without the administration of intravenous contrast. Multiplanar reformatted images are provided for review. Automated exposure control, iterative reconstruction, and/or weight based adjustment of the mA/kV was utilized to reduce the radiation dose to as low as reasonably achievable. COMPARISON: CT head and cervical spine 12/11/2024 HISTORY: ORDERING SYSTEM PROVIDED HISTORY: falls TECHNOLOGIST PROVIDED HISTORY: Reason for exam:->falls Has a \"code stroke\" or \"stroke alert\" been called?->No Decision Support Exception - unselect if not a suspected or

## 2025-08-02 NOTE — FLOWSHEET NOTE
08/01/25 2332   Vital Signs   Temp 98 °F (36.7 °C)   Temp Source Oral   Pulse 83   Heart Rate Source Monitor   Respirations 18   BP (!) 147/98   MAP (Calculated) 114   BP Location Right upper arm   BP Method Automatic   Patient Position Semi fowlers   Oxygen Therapy   SpO2 97 %   O2 Device None (Room air)     Patient resting in bed. No S/S of acute distress noted. Call light in easy reach.   Statement Selected

## 2025-08-02 NOTE — PROGRESS NOTES
Latest Reference Range & Units 08/02/25 04:53   Potassium 3.5 - 5.1 mmol/L 3.2 (L)   (L): Data is abnormally low    Given 40 mEq K PO

## 2025-08-02 NOTE — PROGRESS NOTES
Patient resting in bed, patient is pink, warm, and dry. Respirations E/E on room air. Iv site unremarkable. No S/S of acute distress noted. Head to toe completed at this time. Patient is A/O x4. Medication given paitent tolerated well. Call light in easy reach, patient reminded to use call light for needs and assistance. Bed locked and in lowest position side rails up x2.

## 2025-08-04 NOTE — PROGRESS NOTES
LATE ENTRY    This RN failed to waste medication prior to returning home. Manager, Nikolay Handy was notified of this. This RN brought medication back to the hospital and returned appropriate medication with pharmacists Reji and Sher.     Tina Garrett RN

## 2025-08-21 ENCOUNTER — OFFICE VISIT (OUTPATIENT)
Dept: ORTHOPEDIC SURGERY | Age: 65
End: 2025-08-21
Payer: COMMERCIAL

## 2025-08-21 DIAGNOSIS — M25.532 LEFT WRIST PAIN: Primary | ICD-10-CM

## 2025-08-21 PROCEDURE — 1111F DSCHRG MED/CURRENT MED MERGE: CPT | Performed by: ORTHOPAEDIC SURGERY

## 2025-08-21 PROCEDURE — G8428 CUR MEDS NOT DOCUMENT: HCPCS | Performed by: ORTHOPAEDIC SURGERY

## 2025-08-21 PROCEDURE — G8419 CALC BMI OUT NRM PARAM NOF/U: HCPCS | Performed by: ORTHOPAEDIC SURGERY

## 2025-08-21 PROCEDURE — 99213 OFFICE O/P EST LOW 20 MIN: CPT | Performed by: ORTHOPAEDIC SURGERY

## 2025-08-21 PROCEDURE — 3017F COLORECTAL CA SCREEN DOC REV: CPT | Performed by: ORTHOPAEDIC SURGERY

## 2025-08-21 PROCEDURE — 1036F TOBACCO NON-USER: CPT | Performed by: ORTHOPAEDIC SURGERY

## 2025-08-28 ENCOUNTER — TELEPHONE (OUTPATIENT)
Dept: ORTHOPEDIC SURGERY | Age: 65
End: 2025-08-28

## 2025-09-04 ENCOUNTER — RESULTS FOLLOW-UP (OUTPATIENT)
Dept: ORTHOPEDIC SURGERY | Age: 65
End: 2025-09-04